# Patient Record
Sex: FEMALE | Race: WHITE | Employment: OTHER | URBAN - METROPOLITAN AREA
[De-identification: names, ages, dates, MRNs, and addresses within clinical notes are randomized per-mention and may not be internally consistent; named-entity substitution may affect disease eponyms.]

---

## 2020-12-24 ENCOUNTER — HOSPITAL ENCOUNTER (INPATIENT)
Age: 85
LOS: 6 days | Discharge: NURSING, REHAB OR LONG-TERM CARE | DRG: 481 | End: 2020-12-30
Attending: EMERGENCY MEDICINE | Admitting: INTERNAL MEDICINE
Payer: COMMERCIAL

## 2020-12-24 ENCOUNTER — APPOINTMENT (OUTPATIENT)
Dept: GENERAL RADIOLOGY | Age: 85
DRG: 481 | End: 2020-12-24
Payer: COMMERCIAL

## 2020-12-24 DIAGNOSIS — S72.002A CLOSED FRACTURE OF LEFT HIP, INITIAL ENCOUNTER (HCC): Primary | ICD-10-CM

## 2020-12-24 PROBLEM — I10 ESSENTIAL HYPERTENSION: Status: ACTIVE | Noted: 2020-12-24

## 2020-12-24 PROBLEM — E78.2 MIXED HYPERLIPIDEMIA: Status: ACTIVE | Noted: 2020-12-24

## 2020-12-24 PROBLEM — F42.9 OBSESSIVE-COMPULSIVE DISORDER: Status: ACTIVE | Noted: 2020-12-24

## 2020-12-24 PROBLEM — G31.84 MILD COGNITIVE IMPAIRMENT: Status: ACTIVE | Noted: 2020-12-24

## 2020-12-24 PROBLEM — E11.40 DIABETIC NEUROPATHY (HCC): Status: ACTIVE | Noted: 2020-12-24

## 2020-12-24 LAB
ABSOLUTE EOS #: <0.03 K/UL (ref 0–0.44)
ABSOLUTE IMMATURE GRANULOCYTE: 0.02 K/UL (ref 0–0.3)
ABSOLUTE LYMPH #: 0.87 K/UL (ref 1.1–3.7)
ABSOLUTE MONO #: 0.41 K/UL (ref 0.1–1.2)
ANION GAP SERPL CALCULATED.3IONS-SCNC: 11 MMOL/L (ref 9–17)
BASOPHILS # BLD: 1 % (ref 0–2)
BASOPHILS ABSOLUTE: 0.03 K/UL (ref 0–0.2)
BUN BLDV-MCNC: 27 MG/DL (ref 8–23)
BUN/CREAT BLD: 26 (ref 9–20)
CALCIUM SERPL-MCNC: 9.2 MG/DL (ref 8.6–10.4)
CHLORIDE BLD-SCNC: 102 MMOL/L (ref 98–107)
CO2: 23 MMOL/L (ref 20–31)
CREAT SERPL-MCNC: 1.05 MG/DL (ref 0.5–0.9)
DIFFERENTIAL TYPE: ABNORMAL
EOSINOPHILS RELATIVE PERCENT: 0 % (ref 1–4)
GFR AFRICAN AMERICAN: >60 ML/MIN
GFR NON-AFRICAN AMERICAN: 50 ML/MIN
GFR SERPL CREATININE-BSD FRML MDRD: ABNORMAL ML/MIN/{1.73_M2}
GFR SERPL CREATININE-BSD FRML MDRD: ABNORMAL ML/MIN/{1.73_M2}
GLUCOSE BLD-MCNC: 192 MG/DL (ref 70–99)
HCT VFR BLD CALC: 31.6 % (ref 36.3–47.1)
HEMOGLOBIN: 10.1 G/DL (ref 11.9–15.1)
IMMATURE GRANULOCYTES: 0 %
INR BLD: 0.9
LYMPHOCYTES # BLD: 15 % (ref 24–43)
MCH RBC QN AUTO: 31.8 PG (ref 25.2–33.5)
MCHC RBC AUTO-ENTMCNC: 32 G/DL (ref 28.4–34.8)
MCV RBC AUTO: 99.4 FL (ref 82.6–102.9)
MONOCYTES # BLD: 7 % (ref 3–12)
NRBC AUTOMATED: 0 PER 100 WBC
PARTIAL THROMBOPLASTIN TIME: 29.3 SEC (ref 23.9–33.8)
PDW BLD-RTO: 13.5 % (ref 11.8–14.4)
PLATELET # BLD: 186 K/UL (ref 138–453)
PLATELET ESTIMATE: ABNORMAL
PMV BLD AUTO: 9.2 FL (ref 8.1–13.5)
POTASSIUM SERPL-SCNC: 4.7 MMOL/L (ref 3.7–5.3)
PROTHROMBIN TIME: 12.4 SEC (ref 11.5–14.2)
RBC # BLD: 3.18 M/UL (ref 3.95–5.11)
RBC # BLD: ABNORMAL 10*6/UL
SARS-COV-2, RAPID: NOT DETECTED
SARS-COV-2: NORMAL
SARS-COV-2: NORMAL
SEG NEUTROPHILS: 77 % (ref 36–65)
SEGMENTED NEUTROPHILS ABSOLUTE COUNT: 4.62 K/UL (ref 1.5–8.1)
SODIUM BLD-SCNC: 136 MMOL/L (ref 135–144)
SOURCE: NORMAL
WBC # BLD: 6 K/UL (ref 3.5–11.3)
WBC # BLD: ABNORMAL 10*3/UL

## 2020-12-24 PROCEDURE — 85025 COMPLETE CBC W/AUTO DIFF WBC: CPT

## 2020-12-24 PROCEDURE — 80048 BASIC METABOLIC PNL TOTAL CA: CPT

## 2020-12-24 PROCEDURE — 85610 PROTHROMBIN TIME: CPT

## 2020-12-24 PROCEDURE — 99283 EMERGENCY DEPT VISIT LOW MDM: CPT

## 2020-12-24 PROCEDURE — 73552 X-RAY EXAM OF FEMUR 2/>: CPT

## 2020-12-24 PROCEDURE — 99222 1ST HOSP IP/OBS MODERATE 55: CPT | Performed by: NURSE PRACTITIONER

## 2020-12-24 PROCEDURE — 6370000000 HC RX 637 (ALT 250 FOR IP): Performed by: EMERGENCY MEDICINE

## 2020-12-24 PROCEDURE — U0003 INFECTIOUS AGENT DETECTION BY NUCLEIC ACID (DNA OR RNA); SEVERE ACUTE RESPIRATORY SYNDROME CORONAVIRUS 2 (SARS-COV-2) (CORONAVIRUS DISEASE [COVID-19]), AMPLIFIED PROBE TECHNIQUE, MAKING USE OF HIGH THROUGHPUT TECHNOLOGIES AS DESCRIBED BY CMS-2020-01-R: HCPCS

## 2020-12-24 PROCEDURE — 85730 THROMBOPLASTIN TIME PARTIAL: CPT

## 2020-12-24 PROCEDURE — 1200000000 HC SEMI PRIVATE

## 2020-12-24 PROCEDURE — U0002 COVID-19 LAB TEST NON-CDC: HCPCS

## 2020-12-24 PROCEDURE — 73502 X-RAY EXAM HIP UNI 2-3 VIEWS: CPT

## 2020-12-24 RX ORDER — ACETAMINOPHEN 325 MG/1
650 TABLET ORAL ONCE
Status: COMPLETED | OUTPATIENT
Start: 2020-12-24 | End: 2020-12-24

## 2020-12-24 RX ADMIN — ACETAMINOPHEN 650 MG: 325 TABLET ORAL at 21:59

## 2020-12-24 ASSESSMENT — ENCOUNTER SYMPTOMS
EYES NEGATIVE: 1
ALLERGIC/IMMUNOLOGIC NEGATIVE: 1
SHORTNESS OF BREATH: 0
RESPIRATORY NEGATIVE: 1
GASTROINTESTINAL NEGATIVE: 1
TROUBLE SWALLOWING: 0
ABDOMINAL PAIN: 0

## 2020-12-24 ASSESSMENT — PAIN DESCRIPTION - LOCATION
LOCATION: LEG
LOCATION: LEG

## 2020-12-24 ASSESSMENT — PAIN DESCRIPTION - FREQUENCY: FREQUENCY: CONTINUOUS

## 2020-12-24 ASSESSMENT — PAIN DESCRIPTION - PROGRESSION: CLINICAL_PROGRESSION: GRADUALLY WORSENING

## 2020-12-24 ASSESSMENT — PAIN DESCRIPTION - DESCRIPTORS
DESCRIPTORS: SHARP;CONSTANT
DESCRIPTORS: CONSTANT;THROBBING

## 2020-12-24 ASSESSMENT — PAIN DESCRIPTION - PAIN TYPE: TYPE: ACUTE PAIN

## 2020-12-25 ENCOUNTER — APPOINTMENT (OUTPATIENT)
Dept: GENERAL RADIOLOGY | Age: 85
DRG: 481 | End: 2020-12-25
Payer: COMMERCIAL

## 2020-12-25 ENCOUNTER — ANESTHESIA EVENT (OUTPATIENT)
Dept: OPERATING ROOM | Age: 85
DRG: 481 | End: 2020-12-25
Payer: COMMERCIAL

## 2020-12-25 ENCOUNTER — ANESTHESIA (OUTPATIENT)
Dept: OPERATING ROOM | Age: 85
DRG: 481 | End: 2020-12-25
Payer: COMMERCIAL

## 2020-12-25 VITALS — DIASTOLIC BLOOD PRESSURE: 48 MMHG | SYSTOLIC BLOOD PRESSURE: 91 MMHG | OXYGEN SATURATION: 95 %

## 2020-12-25 PROBLEM — I35.0 MODERATE AORTIC STENOSIS: Status: ACTIVE | Noted: 2020-12-25

## 2020-12-25 LAB
ANION GAP SERPL CALCULATED.3IONS-SCNC: 7 MMOL/L (ref 9–17)
BUN BLDV-MCNC: 24 MG/DL (ref 8–23)
BUN/CREAT BLD: 27 (ref 9–20)
CALCIUM SERPL-MCNC: 8.7 MG/DL (ref 8.6–10.4)
CHLORIDE BLD-SCNC: 106 MMOL/L (ref 98–107)
CO2: 25 MMOL/L (ref 20–31)
CREAT SERPL-MCNC: 0.88 MG/DL (ref 0.5–0.9)
GFR AFRICAN AMERICAN: >60 ML/MIN
GFR NON-AFRICAN AMERICAN: >60 ML/MIN
GFR SERPL CREATININE-BSD FRML MDRD: ABNORMAL ML/MIN/{1.73_M2}
GFR SERPL CREATININE-BSD FRML MDRD: ABNORMAL ML/MIN/{1.73_M2}
GLUCOSE BLD-MCNC: 139 MG/DL (ref 65–105)
GLUCOSE BLD-MCNC: 146 MG/DL (ref 65–105)
GLUCOSE BLD-MCNC: 153 MG/DL (ref 65–105)
GLUCOSE BLD-MCNC: 153 MG/DL (ref 65–105)
GLUCOSE BLD-MCNC: 176 MG/DL (ref 70–99)
GLUCOSE BLD-MCNC: 185 MG/DL (ref 65–105)
HCT VFR BLD CALC: 28.2 % (ref 36.3–47.1)
HEMOGLOBIN: 9.2 G/DL (ref 11.9–15.1)
INR BLD: 1
MCH RBC QN AUTO: 31.8 PG (ref 25.2–33.5)
MCHC RBC AUTO-ENTMCNC: 32.6 G/DL (ref 28.4–34.8)
MCV RBC AUTO: 97.6 FL (ref 82.6–102.9)
NRBC AUTOMATED: 0 PER 100 WBC
PDW BLD-RTO: 13.3 % (ref 11.8–14.4)
PLATELET # BLD: 163 K/UL (ref 138–453)
PMV BLD AUTO: 9 FL (ref 8.1–13.5)
POTASSIUM SERPL-SCNC: 4.8 MMOL/L (ref 3.7–5.3)
PROTHROMBIN TIME: 13.3 SEC (ref 11.5–14.2)
RBC # BLD: 2.89 M/UL (ref 3.95–5.11)
SODIUM BLD-SCNC: 138 MMOL/L (ref 135–144)
TROPONIN INTERP: NORMAL
TROPONIN INTERP: NORMAL
TROPONIN T: NORMAL NG/ML
TROPONIN T: NORMAL NG/ML
TROPONIN, HIGH SENSITIVITY: 12 NG/L (ref 0–14)
TROPONIN, HIGH SENSITIVITY: 12 NG/L (ref 0–14)
WBC # BLD: 3.8 K/UL (ref 3.5–11.3)

## 2020-12-25 PROCEDURE — 72170 X-RAY EXAM OF PELVIS: CPT

## 2020-12-25 PROCEDURE — C1713 ANCHOR/SCREW BN/BN,TIS/BN: HCPCS | Performed by: ORTHOPAEDIC SURGERY

## 2020-12-25 PROCEDURE — 2580000003 HC RX 258: Performed by: STUDENT IN AN ORGANIZED HEALTH CARE EDUCATION/TRAINING PROGRAM

## 2020-12-25 PROCEDURE — 85610 PROTHROMBIN TIME: CPT

## 2020-12-25 PROCEDURE — 36415 COLL VENOUS BLD VENIPUNCTURE: CPT

## 2020-12-25 PROCEDURE — 82947 ASSAY GLUCOSE BLOOD QUANT: CPT

## 2020-12-25 PROCEDURE — 6360000002 HC RX W HCPCS: Performed by: ANESTHESIOLOGY

## 2020-12-25 PROCEDURE — 2720000010 HC SURG SUPPLY STERILE: Performed by: ORTHOPAEDIC SURGERY

## 2020-12-25 PROCEDURE — 2780000010 HC IMPLANT OTHER: Performed by: ORTHOPAEDIC SURGERY

## 2020-12-25 PROCEDURE — C1769 GUIDE WIRE: HCPCS | Performed by: ORTHOPAEDIC SURGERY

## 2020-12-25 PROCEDURE — 7100000001 HC PACU RECOVERY - ADDTL 15 MIN: Performed by: ORTHOPAEDIC SURGERY

## 2020-12-25 PROCEDURE — 85027 COMPLETE CBC AUTOMATED: CPT

## 2020-12-25 PROCEDURE — 99232 SBSQ HOSP IP/OBS MODERATE 35: CPT | Performed by: INTERNAL MEDICINE

## 2020-12-25 PROCEDURE — 0QS706Z REPOSITION LEFT UPPER FEMUR WITH INTRAMEDULLARY INTERNAL FIXATION DEVICE, OPEN APPROACH: ICD-10-PCS | Performed by: STUDENT IN AN ORGANIZED HEALTH CARE EDUCATION/TRAINING PROGRAM

## 2020-12-25 PROCEDURE — 2580000003 HC RX 258: Performed by: ANESTHESIOLOGY

## 2020-12-25 PROCEDURE — 3600000015 HC SURGERY LEVEL 5 ADDTL 15MIN: Performed by: ORTHOPAEDIC SURGERY

## 2020-12-25 PROCEDURE — 93005 ELECTROCARDIOGRAM TRACING: CPT | Performed by: NURSE PRACTITIONER

## 2020-12-25 PROCEDURE — 3700000001 HC ADD 15 MINUTES (ANESTHESIA): Performed by: ORTHOPAEDIC SURGERY

## 2020-12-25 PROCEDURE — 6360000002 HC RX W HCPCS: Performed by: NURSE PRACTITIONER

## 2020-12-25 PROCEDURE — 86901 BLOOD TYPING SEROLOGIC RH(D): CPT

## 2020-12-25 PROCEDURE — 1200000000 HC SEMI PRIVATE

## 2020-12-25 PROCEDURE — 86900 BLOOD TYPING SEROLOGIC ABO: CPT

## 2020-12-25 PROCEDURE — 3209999900 FLUORO FOR SURGICAL PROCEDURES

## 2020-12-25 PROCEDURE — 73552 X-RAY EXAM OF FEMUR 2/>: CPT

## 2020-12-25 PROCEDURE — 80048 BASIC METABOLIC PNL TOTAL CA: CPT

## 2020-12-25 PROCEDURE — 7100000000 HC PACU RECOVERY - FIRST 15 MIN: Performed by: ORTHOPAEDIC SURGERY

## 2020-12-25 PROCEDURE — 84484 ASSAY OF TROPONIN QUANT: CPT

## 2020-12-25 PROCEDURE — 3700000000 HC ANESTHESIA ATTENDED CARE: Performed by: ORTHOPAEDIC SURGERY

## 2020-12-25 PROCEDURE — 86920 COMPATIBILITY TEST SPIN: CPT

## 2020-12-25 PROCEDURE — 2709999900 HC NON-CHARGEABLE SUPPLY: Performed by: ORTHOPAEDIC SURGERY

## 2020-12-25 PROCEDURE — 83036 HEMOGLOBIN GLYCOSYLATED A1C: CPT

## 2020-12-25 PROCEDURE — 6360000002 HC RX W HCPCS: Performed by: STUDENT IN AN ORGANIZED HEALTH CARE EDUCATION/TRAINING PROGRAM

## 2020-12-25 PROCEDURE — 2500000003 HC RX 250 WO HCPCS: Performed by: ANESTHESIOLOGY

## 2020-12-25 PROCEDURE — 3600000005 HC SURGERY LEVEL 5 BASE: Performed by: ORTHOPAEDIC SURGERY

## 2020-12-25 PROCEDURE — 86850 RBC ANTIBODY SCREEN: CPT

## 2020-12-25 PROCEDURE — 6370000000 HC RX 637 (ALT 250 FOR IP): Performed by: STUDENT IN AN ORGANIZED HEALTH CARE EDUCATION/TRAINING PROGRAM

## 2020-12-25 DEVICE — IMPLANTABLE DEVICE: Type: IMPLANTABLE DEVICE | Site: HIP | Status: FUNCTIONAL

## 2020-12-25 DEVICE — NAIL IM L380MM DIA10MM 130DEG LNG L PROX FEM GRN TI CANN: Type: IMPLANTABLE DEVICE | Site: HIP | Status: FUNCTIONAL

## 2020-12-25 DEVICE — SCREW BNE L52MM DIA5MM ST TIB LT GRN TI ST CANN LOK FULL: Type: IMPLANTABLE DEVICE | Site: HIP | Status: FUNCTIONAL

## 2020-12-25 RX ORDER — PROMETHAZINE HYDROCHLORIDE 12.5 MG/1
12.5 TABLET ORAL EVERY 6 HOURS PRN
Status: DISCONTINUED | OUTPATIENT
Start: 2020-12-25 | End: 2020-12-30 | Stop reason: HOSPADM

## 2020-12-25 RX ORDER — SODIUM CHLORIDE 0.9 % (FLUSH) 0.9 %
10 SYRINGE (ML) INJECTION EVERY 12 HOURS SCHEDULED
Status: DISCONTINUED | OUTPATIENT
Start: 2020-12-25 | End: 2020-12-30 | Stop reason: HOSPADM

## 2020-12-25 RX ORDER — SODIUM CHLORIDE, SODIUM LACTATE, POTASSIUM CHLORIDE, CALCIUM CHLORIDE 600; 310; 30; 20 MG/100ML; MG/100ML; MG/100ML; MG/100ML
INJECTION, SOLUTION INTRAVENOUS CONTINUOUS PRN
Status: DISCONTINUED | OUTPATIENT
Start: 2020-12-25 | End: 2020-12-25 | Stop reason: SDUPTHER

## 2020-12-25 RX ORDER — PHENYLEPHRINE HCL IN 0.9% NACL 1 MG/10 ML
SYRINGE (ML) INTRAVENOUS PRN
Status: DISCONTINUED | OUTPATIENT
Start: 2020-12-25 | End: 2020-12-25 | Stop reason: SDUPTHER

## 2020-12-25 RX ORDER — MORPHINE SULFATE 10 MG/ML
INJECTION, SOLUTION INTRAMUSCULAR; INTRAVENOUS PRN
Status: DISCONTINUED | OUTPATIENT
Start: 2020-12-25 | End: 2020-12-25 | Stop reason: SDUPTHER

## 2020-12-25 RX ORDER — NICOTINE 21 MG/24HR
1 PATCH, TRANSDERMAL 24 HOURS TRANSDERMAL DAILY PRN
Status: DISCONTINUED | OUTPATIENT
Start: 2020-12-25 | End: 2020-12-30 | Stop reason: HOSPADM

## 2020-12-25 RX ORDER — POTASSIUM CHLORIDE 7.45 MG/ML
10 INJECTION INTRAVENOUS PRN
Status: DISCONTINUED | OUTPATIENT
Start: 2020-12-25 | End: 2020-12-30 | Stop reason: HOSPADM

## 2020-12-25 RX ORDER — MORPHINE SULFATE 2 MG/ML
2 INJECTION, SOLUTION INTRAMUSCULAR; INTRAVENOUS
Status: DISCONTINUED | OUTPATIENT
Start: 2020-12-25 | End: 2020-12-28

## 2020-12-25 RX ORDER — MAGNESIUM SULFATE 1 G/100ML
1 INJECTION INTRAVENOUS PRN
Status: DISCONTINUED | OUTPATIENT
Start: 2020-12-25 | End: 2020-12-30 | Stop reason: HOSPADM

## 2020-12-25 RX ORDER — SODIUM CHLORIDE 0.9 % (FLUSH) 0.9 %
10 SYRINGE (ML) INJECTION PRN
Status: DISCONTINUED | OUTPATIENT
Start: 2020-12-25 | End: 2020-12-30 | Stop reason: HOSPADM

## 2020-12-25 RX ORDER — ACETAMINOPHEN 650 MG/1
650 SUPPOSITORY RECTAL EVERY 6 HOURS PRN
Status: DISCONTINUED | OUTPATIENT
Start: 2020-12-25 | End: 2020-12-30 | Stop reason: HOSPADM

## 2020-12-25 RX ORDER — POLYETHYLENE GLYCOL 3350 17 G/17G
17 POWDER, FOR SOLUTION ORAL DAILY PRN
Status: DISCONTINUED | OUTPATIENT
Start: 2020-12-25 | End: 2020-12-30 | Stop reason: HOSPADM

## 2020-12-25 RX ORDER — ONDANSETRON 2 MG/ML
4 INJECTION INTRAMUSCULAR; INTRAVENOUS
Status: COMPLETED | OUTPATIENT
Start: 2020-12-25 | End: 2020-12-25

## 2020-12-25 RX ORDER — LIDOCAINE HYDROCHLORIDE 20 MG/ML
INJECTION, SOLUTION EPIDURAL; INFILTRATION; INTRACAUDAL; PERINEURAL PRN
Status: DISCONTINUED | OUTPATIENT
Start: 2020-12-25 | End: 2020-12-25 | Stop reason: SDUPTHER

## 2020-12-25 RX ORDER — POTASSIUM CHLORIDE 20 MEQ/1
40 TABLET, EXTENDED RELEASE ORAL PRN
Status: DISCONTINUED | OUTPATIENT
Start: 2020-12-25 | End: 2020-12-30 | Stop reason: HOSPADM

## 2020-12-25 RX ORDER — HYDROMORPHONE HCL 110MG/55ML
0.25 PATIENT CONTROLLED ANALGESIA SYRINGE INTRAVENOUS EVERY 5 MIN PRN
Status: DISCONTINUED | OUTPATIENT
Start: 2020-12-25 | End: 2020-12-25 | Stop reason: HOSPADM

## 2020-12-25 RX ORDER — MORPHINE SULFATE 4 MG/ML
4 INJECTION, SOLUTION INTRAMUSCULAR; INTRAVENOUS
Status: DISCONTINUED | OUTPATIENT
Start: 2020-12-25 | End: 2020-12-28

## 2020-12-25 RX ORDER — DIPHENHYDRAMINE HYDROCHLORIDE 50 MG/ML
25 INJECTION INTRAMUSCULAR; INTRAVENOUS ONCE
Status: DISCONTINUED | OUTPATIENT
Start: 2020-12-25 | End: 2020-12-30 | Stop reason: HOSPADM

## 2020-12-25 RX ORDER — ONDANSETRON 2 MG/ML
4 INJECTION INTRAMUSCULAR; INTRAVENOUS EVERY 6 HOURS PRN
Status: DISCONTINUED | OUTPATIENT
Start: 2020-12-25 | End: 2020-12-30 | Stop reason: HOSPADM

## 2020-12-25 RX ORDER — SODIUM CHLORIDE 9 MG/ML
INJECTION, SOLUTION INTRAVENOUS CONTINUOUS PRN
Status: DISCONTINUED | OUTPATIENT
Start: 2020-12-25 | End: 2020-12-25 | Stop reason: SDUPTHER

## 2020-12-25 RX ORDER — FENTANYL CITRATE 50 UG/ML
25 INJECTION, SOLUTION INTRAMUSCULAR; INTRAVENOUS EVERY 5 MIN PRN
Status: DISCONTINUED | OUTPATIENT
Start: 2020-12-25 | End: 2020-12-25 | Stop reason: HOSPADM

## 2020-12-25 RX ORDER — PROPOFOL 10 MG/ML
INJECTION, EMULSION INTRAVENOUS CONTINUOUS PRN
Status: DISCONTINUED | OUTPATIENT
Start: 2020-12-25 | End: 2020-12-25 | Stop reason: SDUPTHER

## 2020-12-25 RX ORDER — SODIUM CHLORIDE 9 MG/ML
INJECTION, SOLUTION INTRAVENOUS CONTINUOUS
Status: DISCONTINUED | OUTPATIENT
Start: 2020-12-25 | End: 2020-12-26

## 2020-12-25 RX ORDER — LISINOPRIL 10 MG/1
10 TABLET ORAL DAILY
COMMUNITY

## 2020-12-25 RX ORDER — ACETAMINOPHEN 325 MG/1
650 TABLET ORAL EVERY 6 HOURS PRN
Status: DISCONTINUED | OUTPATIENT
Start: 2020-12-25 | End: 2020-12-30 | Stop reason: HOSPADM

## 2020-12-25 RX ORDER — CEFAZOLIN SODIUM 1 G/3ML
INJECTION, POWDER, FOR SOLUTION INTRAMUSCULAR; INTRAVENOUS PRN
Status: DISCONTINUED | OUTPATIENT
Start: 2020-12-25 | End: 2020-12-25 | Stop reason: SDUPTHER

## 2020-12-25 RX ADMIN — MORPHINE SULFATE 4 MG: 4 INJECTION, SOLUTION INTRAMUSCULAR; INTRAVENOUS at 04:40

## 2020-12-25 RX ADMIN — Medication 100 MCG: at 14:20

## 2020-12-25 RX ADMIN — PROMETHAZINE HYDROCHLORIDE 12.5 MG: 12.5 TABLET ORAL at 16:54

## 2020-12-25 RX ADMIN — Medication 50 MCG: at 14:32

## 2020-12-25 RX ADMIN — SODIUM CHLORIDE: 9 INJECTION, SOLUTION INTRAVENOUS at 17:40

## 2020-12-25 RX ADMIN — Medication 100 MCG: at 13:24

## 2020-12-25 RX ADMIN — Medication 50 MCG: at 14:14

## 2020-12-25 RX ADMIN — Medication 100 MCG: at 13:59

## 2020-12-25 RX ADMIN — SODIUM CHLORIDE, POTASSIUM CHLORIDE, SODIUM LACTATE AND CALCIUM CHLORIDE: 600; 310; 30; 20 INJECTION, SOLUTION INTRAVENOUS at 14:43

## 2020-12-25 RX ADMIN — MORPHINE SULFATE 4 MG: 4 INJECTION, SOLUTION INTRAMUSCULAR; INTRAVENOUS at 08:14

## 2020-12-25 RX ADMIN — SODIUM CHLORIDE: 9 INJECTION, SOLUTION INTRAVENOUS at 13:23

## 2020-12-25 RX ADMIN — CEFAZOLIN 2 G: 10 INJECTION, POWDER, FOR SOLUTION INTRAVENOUS at 17:45

## 2020-12-25 RX ADMIN — ONDANSETRON 4 MG: 2 INJECTION INTRAMUSCULAR; INTRAVENOUS at 15:36

## 2020-12-25 RX ADMIN — Medication 100 MCG: at 13:29

## 2020-12-25 RX ADMIN — Medication 100 MCG: at 13:55

## 2020-12-25 RX ADMIN — MORPHINE SULFATE 2 MG: 10 INJECTION INTRAVENOUS at 14:47

## 2020-12-25 RX ADMIN — Medication 100 MCG: at 13:26

## 2020-12-25 RX ADMIN — Medication 100 MCG: at 13:45

## 2020-12-25 RX ADMIN — Medication 100 MCG: at 13:11

## 2020-12-25 RX ADMIN — Medication 75 MCG: at 13:42

## 2020-12-25 RX ADMIN — LIDOCAINE HYDROCHLORIDE 5 ML: 20 INJECTION, SOLUTION EPIDURAL; INFILTRATION; INTRACAUDAL; PERINEURAL at 13:00

## 2020-12-25 RX ADMIN — MORPHINE SULFATE 4 MG: 4 INJECTION, SOLUTION INTRAMUSCULAR; INTRAVENOUS at 00:33

## 2020-12-25 RX ADMIN — PROPOFOL 30 MCG/KG/MIN: 10 INJECTION, EMULSION INTRAVENOUS at 13:01

## 2020-12-25 RX ADMIN — Medication 50 MCG: at 14:28

## 2020-12-25 RX ADMIN — CEFAZOLIN 2000 MG: 1 INJECTION, POWDER, FOR SOLUTION INTRAMUSCULAR; INTRAVENOUS at 13:17

## 2020-12-25 ASSESSMENT — PULMONARY FUNCTION TESTS
PIF_VALUE: 1
PIF_VALUE: 0
PIF_VALUE: 1
PIF_VALUE: 0
PIF_VALUE: 1
PIF_VALUE: 0
PIF_VALUE: 1
PIF_VALUE: 0
PIF_VALUE: 1
PIF_VALUE: 0
PIF_VALUE: 1
PIF_VALUE: 0
PIF_VALUE: 1
PIF_VALUE: 0
PIF_VALUE: 0
PIF_VALUE: 1
PIF_VALUE: 0
PIF_VALUE: 1
PIF_VALUE: 0
PIF_VALUE: 1
PIF_VALUE: 1
PIF_VALUE: 0
PIF_VALUE: 1
PIF_VALUE: 0
PIF_VALUE: 1
PIF_VALUE: 0
PIF_VALUE: 1
PIF_VALUE: 0
PIF_VALUE: 1
PIF_VALUE: 0
PIF_VALUE: 1
PIF_VALUE: 0
PIF_VALUE: 1
PIF_VALUE: 0
PIF_VALUE: 1
PIF_VALUE: 0
PIF_VALUE: 0
PIF_VALUE: 1
PIF_VALUE: 0
PIF_VALUE: 1
PIF_VALUE: 0
PIF_VALUE: 1
PIF_VALUE: 0
PIF_VALUE: 1
PIF_VALUE: 0
PIF_VALUE: 0
PIF_VALUE: 1
PIF_VALUE: 0
PIF_VALUE: 1

## 2020-12-25 ASSESSMENT — PAIN SCALES - GENERAL
PAINLEVEL_OUTOF10: 7
PAINLEVEL_OUTOF10: 9
PAINLEVEL_OUTOF10: 10
PAINLEVEL_OUTOF10: 0

## 2020-12-25 ASSESSMENT — PAIN DESCRIPTION - LOCATION: LOCATION: HIP

## 2020-12-25 ASSESSMENT — PAIN DESCRIPTION - ORIENTATION: ORIENTATION: LEFT

## 2020-12-25 ASSESSMENT — ENCOUNTER SYMPTOMS: BACK PAIN: 0

## 2020-12-25 ASSESSMENT — LIFESTYLE VARIABLES: SMOKING_STATUS: 0

## 2020-12-25 NOTE — ED TRIAGE NOTES
Here tonight with daughter. Daughter states pt fell at granddaughter's house when stepping down one step tonight at [address]. She was walking alone with cane, fell down on left side. \"I contemplated calling EMS, but was able to get her up and here in her car. \" States her lateral left thigh is sore. Left leg noted to be externally rotated and is shorter than left leg. No ecchymosis nor edema noted. Palpable pedal pulses. Feet cool, dry. Dr Cisse Memos in to see pt.

## 2020-12-25 NOTE — H&P
History and Physical        CHIEF COMPLAINT: Left hip pain    HISTORY OF PRESENT ILLNESS:      The patient is a 81 y.o. female  who presents with left hip pain after a fall. The patient was attending a [employer] party and had a slip and fall in the house. She experienced immediate pain and inability to ambulate after the fall. She was brought to [facility] where she was diagnosed as having a left hip fracture. Orthopedic consultation was requested.     Past Medical History:    Past Medical History:   Diagnosis Date    Diabetes mellitus ([address]     Hyperlipidemia     Hypertension        Past Surgical History:    Past Surgical History:   Procedure Laterality Date    APPENDECTOMY      CHOLECYSTECTOMY         Medications Prior to Admission:   Current Facility-Administered Medications   Medication Dose Route Frequency Provider Last Rate Last Admin    sodium chloride flush 0.9 % injection 10 mL  10 mL Intravenous 2 times per day SHIRA Nguyen - CNP        sodium chloride flush 0.9 % injection 10 mL  10 mL Intravenous PRN SHIRA Nguyen CNP        potassium chloride (KLOR-CON M) extended release tablet 40 mEq  40 mEq Oral PRN SHIRA Nguyen - CNP        Or    potassium bicarb-citric acid (EFFER-K) effervescent tablet 40 mEq  40 mEq Oral PRN SHIRA Nguyen CNP        Or    potassium chloride 10 mEq/100 mL IVPB (Peripheral Line)  10 mEq Intravenous PRN SHIRA Nguyen CNP        magnesium sulfate 1 g in dextrose 5% 100 mL IVPB  1 g Intravenous PRN SHIRA Nguyen - CNP        enoxaparin (LOVENOX) injection 40 mg  40 mg Subcutaneous Daily SHIRA Nguyen CNP        promethazine (PHENERGAN) tablet 12.5 mg  12.5 mg Oral Q6H PRN SHIRA Nguyen CNP        Or    ondansetron (ZOFRAN) injection 4 mg  4 mg Intravenous Q6H PRN SHIRA Nguyen CNP        polyethylene glycol (GLYCOLAX) packet 17 g  17 g Oral Daily PRN Dinorah Barton APRN - CNP        nicotine (NICODERM CQ) 21 MG/24HR 1 patch  1 patch Transdermal Daily PRN Dinorah Barton APRN - CNP        acetaminophen (TYLENOL) tablet 650 mg  650 mg Oral Q6H PRN Dinorah AINSLEY Barton, APRN - CNP        Or    acetaminophen (TYLENOL) suppository 650 mg  650 mg Rectal Q6H PRN Dinorah Barton APRN - CNP        diphenhydrAMINE (BENADRYL) injection 25 mg  25 mg Intravenous Once Roro HuynhSHIRA - CNP        morphine (PF) injection 2 mg  2 mg Intravenous Q3H PRN Htao Bodily APRN - CNP        Or    morphine sulfate (PF) injection 4 mg  4 mg Intravenous Q3H PRN Thao Bodily, APRN - CNP   4 mg at 12/25/20 0814    0.9 % sodium chloride infusion   Intravenous Continuous SHIRA Nguyen - CNP 75 mL/hr at 12/25/20 0455 Restarted at 12/25/20 0455         Allergies:  Aleve [naproxen]    Social History:   Social History     Tobacco Use   Smoking Status Never Smoker   Smokeless Tobacco Never Used     Social History     Substance and Sexual Activity   Alcohol Use Never    Frequency: Never     Social History     Substance and Sexual Activity   Drug Use Never       Family History:  History reviewed. No pertinent family history.       REVIEW OF SYSTEMS:  Gen: Negative for nausea, vomiting, diarrhea, fever, chills, night sweats  Heart: Negative for HTN, palpitations, chest pain  Lungs: Negative for wheezes, asthma or SOB  GI: Negative for nausea, vomiting  Endo: Negative for diabetes  Heme: Negative for DVT       PHYSICAL EXAM:  Patient Vitals for the past 24 hrs:   BP Temp Temp src Pulse Resp SpO2 Height Weight   12/25/20 0622 (!) 143/50 97.5 °F (36.4 °C) Oral 65 16 98 % -- --   12/25/20 0605 -- -- -- -- -- -- -- 198 lb 4.8 oz (89.9 kg)   12/25/20 0013 (!) 132/53 97.5 °F (36.4 °C) Oral 68 18 99 % -- --   12/24/20 2315 (!) 140/58 -- -- 65 16 100 % -- --   12/24/20 2051 (!) 126/52 97.5 °F (36.4 °C) Oral 59 18 100 % 5' 5\" (1.651 m) 198 lb (89.8 kg) Gen: alert and oriented  Head: normorcephalic, atraumatic  Neck: supple  Heart: RRR  Lungs: No audible wheezes  Abdomen: soft  Pelvis: stable  Extremity pleasant female in no acute distress and awake and oriented x3. She is neurovascularly intact in the entire left lower extremity. Her skin is clean dry and intact. Cap refill is less than 2 seconds. Patient's extremity is painful to any motion. DATA:  CBC:   Lab Results   Component Value Date    WBC 3.8 12/25/2020    HGB 9.2 12/25/2020     12/25/2020     BMP:    Lab Results   Component Value Date     12/25/2020    K 4.8 12/25/2020     12/25/2020    CO2 25 12/25/2020    BUN 24 12/25/2020    CREATININE 0.88 12/25/2020    CALCIUM 8.7 12/25/2020    GLUCOSE 176 12/25/2020     PT/INR:    Lab Results   Component Value Date    PROTIME 13.3 12/25/2020    INR 1.0 12/25/2020     Troponin:  No results found for: [address] [place] [address]    Radiology:   EXAMINATION:   ONE XRAY VIEW OF THE PELVIS AND TWO XRAY VIEWS LEFT HIP       12/24/2020 9:40 pm       COMPARISON:   None.       HISTORY:   ORDERING SYSTEM PROVIDED HISTORY: pain after fall   TECHNOLOGIST PROVIDED HISTORY:   pain after fall   Reason for Exam: Pain in left hip, fall today   Acuity: Acute   Type of Exam: Initial       FINDINGS:   Diffuse bone demineralization.  Comminuted, displaced intertrochanteric left   femoral fracture.  Left hip alignment is maintained.  Degenerative changes   are noted in the lower lumbar spine and both hips.           Impression   Comminuted, displaced intertrochanteric left femoral fracture. Patient has a comminuted reverse oblique intertrochanteric fracture. ASSESSMENT:Principal Problem:    Closed displaced fracture of left femoral neck (HCC)  Active Problems:    Diabetic neuropathy (HCC)    Essential hypertension    Mixed hyperlipidemia    Mild cognitive impairment    Obsessive-compulsive disorder  Resolved Problems:    * No resolved hospital problems.  * PLAN:  1) when the patient is clear for surgery I plan on performing a left hip ORIF using a cephalomedullary nail. I would like to perform this as soon as possible.       Antonio Zhang MD

## 2020-12-25 NOTE — CONSULTS
[facility] Cardiology   Consult Note             Date:   12/25/2020  Patient name: Asael Brasher  Date of admission:  12/24/2020  8:57 PM  MRN:   [id number]  YOB: 1935    Reason for Admission:  Preoperative cardiac evaluation    CHIEF COMPLAINT:  Hip fracture     History Obtained From:  patient, electronic medical record    HISTORY OF PRESENT ILLNESS:      The patient is a 81 y.o.  female who is admitted to the hospital for left hip fracture. Cardiology was consulted for preoperative cardiac evaluation. She was at a [employer] and slipped and fell on the stairs. She had a left hip fracture and cardiology was consulted to clear him for surgery. No chest pain orthopnea proximal to the dyspnea no history of smoking    Past Medical History:   has a past medical history of Diabetes mellitus ([address] Hyperlipidemia, and Hypertension. Past Surgical History:   has a past surgical history that includes Cholecystectomy and Appendectomy. Home Medications:    Prior to Admission medications    Medication Sig Start Date End Date Taking? Authorizing Provider   lisinopril (PRINIVIL;ZESTRIL) 10 MG tablet Take 10 mg by mouth daily   Yes Historical Provider, MD   METFORMIN HCL PO Take by mouth 2 times daily   Yes Historical Provider, MD   Atorvastatin Calcium (LIPITOR PO) Take by mouth daily   Yes Historical Provider, MD       Allergies:  Aleve [naproxen]    Social History:   reports that she has never smoked. She has never used smokeless tobacco. She reports that she does not drink alcohol or use drugs. Family History: family history is not on file. No for premature CAD. No for h/o sudden cardiac death    REVIEW OF SYSTEMS:    · Constitutional: there has been no unanticipated weight loss. There's been No change in energy level, No change in activity level. · Eyes: No visual changes or diplopia. No scleral icterus. · ENT: No Headaches, hearing loss or vertigo.  No mouth sores or sore present  Extremities:  ·  No Cyanosis or Clubbing  ·  Lower extremity edema: No  ·  Skin: Warm and dry  Neurological:  · Alert and oriented. · Moves all extremities well  · No abnormalities of mood, affect, memory, mentation, or behavior are noted    DATA:    Diagnostics:      EKG: normal EKG, normal sinus rhythm, unchanged from previous tracings. ECHO: Pending  Labs:     CBC:   Recent Labs     12/24/20 2200 12/25/20  0659   WBC 6.0 3.8   HGB 10.1* 9.2*   HCT 31.6* 28.2*    163     BMP:   Recent Labs     12/24/20 2214 12/25/20  0659    138   K 4.7 4.8   CO2 23 25   BUN 27* 24*   CREATININE 1.05* 0.88   LABGLOM 50* >60   GLUCOSE 192* 176*     BNP: No results for input(s): BNP in the last 72 hours. PT/INR:   Recent Labs     12/24/20 2214 12/25/20  0659   PROTIME 12.4 13.3   INR 0.9 1.0     APTT:  Recent Labs     12/24/20 2214   APTT 29.3     CARDIAC ENZYMES:No results for input(s): CKTOTAL, CKMB, CKMBINDEX, TROPONINI in the last 72 hours. FASTING LIPID PANEL:No results found for: HDL, LDLDIRECT, LDLCALC, TRIG  LIVER PROFILE:No results for input(s): AST, ALT, LABALBU in the last 72 hours. IMPRESSION:    Patient Active Problem List   Diagnosis    Diabetic neuropathy ([facility]    Essential hypertension    Mixed hyperlipidemia    Mild cognitive impairment    Obsessive-compulsive disorder    Closed displaced fracture of left femoral neck (HCC)       RECOMMENDATIONS:  1. Preoperative cardiac evaluation  2. Murmur which is most likely aortic stenosis  3. Left hip fracture  Patient is cleared for surgery as low risk. She has no history of coronary artery disease, no history of shortness of breath and has an incidentally noted murmur which is most likely moderate aortic stenosis. Has not had any history of chest pain presyncope or syncope. Patient can go for surgery and we will evaluate her valve as an outpatient in the future. Thank you for allowing me to see this patient.     Discussed with patient and nursing.     Isacc Tapia MD  [facility] cardiology

## 2020-12-25 NOTE — DISCHARGE INSTR - COC
Continuity of Care Form    Patient Name: Ivan Zuñiga   :  1935  MRN:  [id number]    Admit date:  2020  Discharge date:  2020    Code Status Order: Full Code   Advance Directives:   [facility] Documentation       Date/Time Healthcare Directive Type of Healthcare Directive Copy in [address] Agent's Name Healthcare Agent's Phone Number    [phone]  Yes, patient has an advance directive for healthcare treatment  Durable power of  for health care  No, copy requested from family  Adult  [address]  --            Admitting Physician:  Myra Zapata MD  PCP: Paco Desouza MD    Discharging Nurse: [facility] Unit/Room#: [facility]  Discharging Unit Phone Number: [phone]    Emergency Contact:   Extended Emergency Contact Information  Primary Emergency Contact: [address] [place] [address], [place] [address] Phone: [phone]  Relation: Child    Past Surgical History:  Past Surgical History:   Procedure Laterality Date    APPENDECTOMY      CHOLECYSTECTOMY         Immunization History: There is no immunization history on file for this patient.     Active Problems:  Patient Active Problem List   Diagnosis Code    Diabetic neuropathy ([facility] E11.40    Essential hypertension I10    Mixed hyperlipidemia E78.2    Mild cognitive impairment G31.84    Obsessive-compulsive disorder F42.9    Closed displaced fracture of left femoral neck (HCC) S72.002A    Moderate aortic stenosis I35.0       Isolation/Infection:   Isolation            No Isolation          Patient Infection Status       None to display            Nurse Assessment:  Last Vital Signs: BP (!) 129/53   Pulse 62   Temp 97.6 °F (36.4 °C) (Oral)   Resp 18   Ht 5' 5\" (1.651 m)   Wt 198 lb 4.8 oz (89.9 kg)   SpO2 97%   BMI 33.00 kg/m²     Last documented pain score (0-10 scale): Pain Level: 9  Last Weight:   Wt Readings from Last 1 Encounters:   20 198 lb 4.8 oz (89.9 kg)     Mental Status:  oriented and alert    IV Access:  - None    Nursing Mobility/ADLs:  Walking   Assisted  Transfer  Assisted  Bathing  Assisted  Dressing  Assisted  Toileting  Assisted  Feeding  [address] Delivery   whole    Wound Care Documentation and Therapy:        Elimination:  Continence:   · Bowel: Yes  · Bladder: Yes-uses brief for stress incontinence   Urinary Catheter: {Urinary Catheter:[id number]}   Colostomy/Ileostomy/Ileal Conduit: No       Date of Last BM: ***    Intake/Output Summary (Last 24 hours) at 12/25/2020 1453  Last data filed at 12/25/2020 1442  Gross per 24 hour   Intake 1000 ml   Output 150 ml   Net 850 ml     No intake/output data recorded. Safety Concerns:     History of Falls (last 30 days) and At Risk for Falls    Impairments/Disabilities:      None    Nutrition Therapy:  Current Nutrition Therapy:   - Oral Diet:  General    Routes of Feeding: Oral  Liquids: No Restrictions  Daily Fluid Restriction: no  Last Modified Barium Swallow with Video (Video Swallowing Test): not done    Treatments at the Time of Hospital Discharge:   Respiratory Treatments: none  Oxygen Therapy:  is not on home oxygen therapy.   Ventilator:    - No ventilator support    Rehab Therapies: Physical Therapy and Occupational Therapy  Weight Bearing Status/Restrictions: No weight bearing restirctions  Other Medical Equipment (for information only, NOT a DME order):  walker, bath bench and bedside commode  Other Treatments: skilled nursing care, FWB, maintain skin integrity-mepilex to coccyx w/every two hour turns,follow up with Dr. Grace Browne in one week     Patient's personal belongings (please select all that are sent with patient):  Glasses, Dentures upper and lower    RN SIGNATURE:  Electronically signed by Ike Covington RN on 12/30/20 at 11:54 AM EST    CASE MANAGEMENT/SOCIAL WORK SECTION    Inpatient Status Date: 12/24/2020    Readmission Risk Assessment Score:  Readmission Risk              Risk of Unplanned Readmission:        12           Discharging to Facility/ Agency   · Name: [facility]  · Address:  · Phone:[phone]   · SZU:[phone]    Prior To Admission Patient was utilizing  · Name: [id number]  · Phone: [phone]  · Fax:    / signature: Electronically signed by REBECCA Robertson LSW on 12/28/20 at 4:22 PM EST    PHYSICIAN SECTION    Prognosis: Fair    Condition at Discharge: Stable    Rehab Potential (if transferring to Rehab): Fair    Recommended Labs or Other Treatments After Discharge:   Orthopedic evaluation and treatment, f/u Dr Nancy Powers   Physical therapy / rehab  DVT prophylaxis: Eliquis  Pain management   Glycemic contol - monitor and control blood sugars  Blood Pressure - Monitor and control  Blood products prn - will check H&H  Anemia w/u on outpatient basis is suggested     Physician Certification: I certify the above information and transfer of Trinity Becerra  is necessary for the continuing treatment of the diagnosis listed and that she requires [facility] for less 30 days. Update Admission H&P:   Principal Problem:    Closed displaced fracture of left femoral neck (HCC)  Active Problems:    Diabetic neuropathy (HCC)    Essential hypertension    Mixed hyperlipidemia    Mild cognitive impairment    Obsessive-compulsive disorder    Moderate aortic stenosis  Resolved Problems:    * No resolved hospital problems.  *     PHYSICIAN SIGNATURE:  Electronically signed by Dinora Davis DO on 12/29/20 at 10:10 AM EST

## 2020-12-25 NOTE — ANESTHESIA PRE PROCEDURE
Department of Anesthesiology  Preprocedure Note       Name:  Ashutosh Rocha   Age:  81 y.o.  :  1935                                          MRN:  [id number]         Date:  2020      Surgeon: Thu Renteria):  Katie Guerrero MD    Procedure: Procedure(s):  HIP PINNING    Medications prior to admission:   Prior to Admission medications    Medication Sig Start Date End Date Taking?  Authorizing Provider   lisinopril (PRINIVIL;ZESTRIL) 10 MG tablet Take 10 mg by mouth daily   Yes Historical Provider, MD   METFORMIN HCL PO Take by mouth 2 times daily   Yes Historical Provider, MD   Atorvastatin Calcium (LIPITOR PO) Take by mouth daily   Yes Historical Provider, MD       Current medications:    Current Facility-Administered Medications   Medication Dose Route Frequency Provider Last Rate Last Admin    sodium chloride flush 0.9 % injection 10 mL  10 mL Intravenous 2 times per day Dinorah Barton APRN - CNP        sodium chloride flush 0.9 % injection 10 mL  10 mL Intravenous PRN Dinorah Barton APRN - CNP        potassium chloride (KLOR-CON M) extended release tablet 40 mEq  40 mEq Oral PRN Dinorah Barton APRN - CNP        Or    potassium bicarb-citric acid (EFFER-K) effervescent tablet 40 mEq  40 mEq Oral PRN Dinorah Barton APRN - CNP        Or    potassium chloride 10 mEq/100 mL IVPB (Peripheral Line)  10 mEq Intravenous PRN Dinorah Barton APRN - CNP        magnesium sulfate 1 g in dextrose 5% 100 mL IVPB  1 g Intravenous PRN Dinorah Barton APRN - CNP        enoxaparin (LOVENOX) injection 40 mg  40 mg Subcutaneous Daily Dinorah Barton APRN - CNP        promethazine (PHENERGAN) tablet 12.5 mg  12.5 mg Oral Q6H PRN Dinorah Barton APRN - CNP        Or    ondansetron (ZOFRAN) injection 4 mg  4 mg Intravenous Q6H PRN Dinorah Barton APRN - CNP        polyethylene glycol (GLYCOLAX) packet 17 g  17 g Oral Daily PRN Dinorah Barton APRN - CNP  nicotine (NICODERM CQ) 21 MG/24HR 1 patch  1 patch Transdermal Daily PRN Dinorah D Delgrosso, APRN - CNP        acetaminophen (TYLENOL) tablet 650 mg  650 mg Oral Q6H PRN Dinorah D Delgrosso, APRN - CNP        Or    acetaminophen (TYLENOL) suppository 650 mg  650 mg Rectal Q6H PRN Dinorah D Delgrosso, APRN - CNP        diphenhydrAMINE (BENADRYL) injection 25 mg  25 mg Intravenous Once Ludin Libia, APRN - CNP        morphine (PF) injection 2 mg  2 mg Intravenous Q3H PRN Ludin Libia, APRN - CNP        Or    morphine sulfate (PF) injection 4 mg  4 mg Intravenous Q3H PRN Ludin Libia, APRN - CNP   4 mg at 12/25/20 0814    0.9 % sodium chloride infusion   Intravenous Continuous Dinorah D Delgrosso, APRN - CNP 75 mL/hr at 12/25/20 0455 Restarted at 12/25/20 0455       Allergies:     Allergies   Allergen Reactions    Aleve [Naproxen] Swelling       Problem List:    Patient Active Problem List   Diagnosis Code    Diabetic neuropathy ([facility] E11.40    Essential hypertension I10    Mixed hyperlipidemia E78.2    Mild cognitive impairment G31.84    Obsessive-compulsive disorder F42.9    Closed displaced fracture of left femoral neck ([facility] S72.002A       Past Medical History:        Diagnosis Date    Diabetes mellitus ([facility]     Hyperlipidemia     Hypertension        Past Surgical History:        Procedure Laterality Date    APPENDECTOMY      CHOLECYSTECTOMY         Social History:    Social History     Tobacco Use    Smoking status: Never Smoker    Smokeless tobacco: Never Used   Substance Use Topics    Alcohol use: Never     Frequency: Never                                Counseling given: Not Answered      Vital Signs (Current):   Vitals:    12/25/20 0013 12/25/20 0605 12/25/20 0622 12/25/20 1120   BP: (!) 132/53  (!) 143/50 (!) 129/53   Pulse: 68  65 62   Resp: 18  16 18   Temp: 97.5 °F (36.4 °C)  97.5 °F (36.4 °C) 97.6 °F (36.4 °C)   TempSrc: Oral  Oral Oral   SpO2: 99%  98% 97% Weight:  198 lb 4.8 oz (89.9 kg)     Height:                                                  BP Readings from Last 3 Encounters:   12/25/20 (!) 129/53       NPO Status:                                                                                 BMI:   Wt Readings from Last 3 Encounters:   12/25/20 198 lb 4.8 oz (89.9 kg)     Body mass index is 33 kg/m².     CBC:   Lab Results   Component Value Date    WBC 3.8 12/25/2020    RBC 2.89 12/25/2020    HGB 9.2 12/25/2020    HCT 28.2 12/25/2020    MCV 97.6 12/25/2020    RDW 13.3 12/25/2020     12/25/2020       CMP:   Lab Results   Component Value Date     12/25/2020    K 4.8 12/25/2020     12/25/2020    CO2 25 12/25/2020    BUN 24 12/25/2020    CREATININE 0.88 12/25/2020    GFRAA >60 12/25/2020    LABGLOM >60 12/25/2020    GLUCOSE 176 12/25/2020    CALCIUM 8.7 12/25/2020       POC Tests:   Recent Labs     12/25/20  1105   POCGLU 146*       Coags:   Lab Results   Component Value Date    PROTIME 13.3 12/25/2020    INR 1.0 12/25/2020    APTT 29.3 12/24/2020       HCG (If Applicable): No results found for: PREGTESTUR, PREGSERUM, HCG, HCGQUANT     ABGs: No results found for: PHART, PO2ART, ATP7CFB, YHG1WEJ, BEART, T4MFQKTX     Type & Screen (If Applicable):  No results found for: LABABO, LABRH    Drug/Infectious Status (If Applicable):  No results found for: HIV, HEPCAB    COVID-19 Screening (If Applicable):   Lab Results   Component Value Date    COVID19 Not Detected 12/24/2020         Anesthesia Evaluation   no history of anesthetic complications:   Airway: Mallampati: II     Neck ROM: full  Mouth opening: > = 3 FB Dental:    (+) lower dentures and upper dentures      Pulmonary:Negative Pulmonary ROS and normal exam        (-) COPD and not a current smoker                           Cardiovascular:  Exercise tolerance: poor (<4 METS),   (+) hypertension:, murmur,     (-)  angina       Beta Blocker:  Not on Beta Blocker         Neuro/Psych: (+) psychiatric history (ocd):            GI/Hepatic/Renal:        (-) GERD       Endo/Other:    (+) Diabetes, . Abdominal:           Vascular:                                      Anesthesia Plan      spinal     ASA 3 - emergent     (Npo, no anticoags)  Induction: intravenous. MIPS: Postoperative opioids intended and Prophylactic antiemetics administered. Anesthetic plan and risks discussed with patient and child/children. Use of blood products discussed with patient whom consented to blood products. Plan discussed with CRNA.     Attending anesthesiologist reviewed and agrees with Cedrick Fitch MD   12/25/2020

## 2020-12-25 NOTE — ED NOTES
Daughter, Chery Alberto, called and updated on admission. Tim Lee is currently in [facility]. Plans to come to hospital early tomorrow morning to see mother. Hopes to see pt before possible surgery tomorrow. Requests medical-surgical nurse calls her with update, if she is not here at hospital before surgery.      Arminda Luevano RN  12/24/20 6406

## 2020-12-25 NOTE — ED NOTES
To room 2024 per stretcher. Granddaughter gone home. Clothing and cane with pt. [place] home with granddaughter. Bedside updates given to [facility], medical-surgical RN. Assisted getting pt to bed from stretcher while holding affected left leg. Tolerated well. Reassured.      Amador Amaya RN  12/25/20 1993

## 2020-12-25 NOTE — PLAN OF CARE
Problem: Pain:  Goal: Control of acute pain  Description: Control of acute pain  Outcome: Ongoing  Note: Pain level assessment complete. Patient educated on pain scale and control interventions  Morphine PRN pain medication given per patient request  Patient instructed to call out with new onset of pain or unrelieved pain  Will continue to monitor. Problem: Skin Integrity:  Goal: Will show no infection signs and symptoms  Description: Will show no infection signs and symptoms  Outcome: Ongoing     Problem: Falls - Risk of:  Goal: Will remain free from falls  Description: Will remain free from falls  Outcome: Ongoing  Note: Siderails up x 2  Hourly rounding  Call light in reach  Instructed to call for assist before attempting out of bed. Remains free from falls and accidental injury at this time   Floor free from obstacles  Bed is locked and in lowest position  Adequate lighting provided  Bed alarm on, [place] star and Stay with Me signs posted  Will continue to monitor.

## 2020-12-25 NOTE — H&P
[facility]  Office: [address], DO, Nicholas White, DO, Ny Baires, DO, Shashank Davisalexsander Blood, DO, Jesus Carcamo MD, Jhony Nava MD, Stanislaw Pineda MD, Milad Martinez MD, Wellington Vásquez MD, Hammad Azevedo MD, Katie Urena MD, Tony Ayala MD, Juliana Caballero MD, Barbara Linn DO, Yesenia Balderas MD, Lyla Castleman, MD, Nicho Weber DO, Edu Aggarwal MD,  Robina Abreu DO, Bibi Waite MD, Sandro Fu MD, Jaclyn Bradshaw, [facility], [facility], CNP, Rachael Martell, CNP, Ruth Mccoy, CNS, Lia Wang, CNP, Jet Barcenas, CNP, Carroll Taveras, CNP, Wilton Driscoll, CNP, Tonio Uribe, CNP, Sahara Saavedra PA-C, Shaye Berrios, [facility], Sam Ruff, CNP, Sudha Almaraz, CNP, Alan Spencer, CNP, Shirin Guidry, CNP, Vida Calles, CNP         [address]    HISTORY AND PHYSICAL EXAMINATION            Date:   12/25/2020  Patient name:  Juliann Dong  Date of admission:  12/24/2020  8:57 PM  MRN:   [id number]  Account:  [de-identified]  YOB: 1935  PCP:    Aimee Cordero MD  Room:   3105/4918-20  Code Status:    Full Code    Chief Complaint:     Chief Complaint   Patient presents with    Leg Pain     fell this evening       History Obtained From:     patient, electronic medical record    History of Present Illness:     Juliann Dong is a 81 y.o. Non-/non  female who presents with Leg Pain (fell this evening)   and is admitted to the hospital for the management of Closed displaced fracture of left femoral neck ([facility] [address] Patient presented to the emergency department after a fall with a chief complaint of left hip and leg pain. She states she was going down the steps when her shoe slipped and she fell. She did not hit her head or lose consciousness. The fall happened around 7 PM.  She denies any other injuries.   She has a history of hypertension, hyperlipidemia, OCD, diabetic neuropathy, mild cognitive impairment. X-ray of the hip demonstrates a comminuted displaced intertrochanteric left femoral fracture. Orthopedic surgery was consulted by the emergency department. She is being admitted for further management of closed displaced fracture of the left femoral neck. Past Medical History:     Past Medical History:   Diagnosis Date    Diabetes mellitus ([address]     Hyperlipidemia     Hypertension         Past Surgical History:     Past Surgical History:   Procedure Laterality Date    APPENDECTOMY      CHOLECYSTECTOMY          Medications Prior to Admission:     Prior to Admission medications    Medication Sig Start Date End Date Taking? Authorizing Provider   lisinopril (PRINIVIL;ZESTRIL) 10 MG tablet Take 10 mg by mouth daily   Yes Historical Provider, MD   METFORMIN HCL PO Take by mouth 2 times daily   Yes Historical Provider, MD   Atorvastatin Calcium (LIPITOR PO) Take by mouth daily   Yes Historical Provider, MD        Allergies:     Aleve [naproxen]    Social History:     Tobacco:    reports that she has never smoked. She has never used smokeless tobacco.  Alcohol:      reports no history of alcohol use. Drug Use:  reports no history of drug use. Family History:     History reviewed. No pertinent family history. Review of Systems:     Positive and Negative as described in HPI. Review of Systems   Constitutional: Negative. HENT: Negative. Eyes: Negative. Respiratory: Negative. Cardiovascular: Negative. Gastrointestinal: Negative. Endocrine: Negative. Genitourinary: Negative. Musculoskeletal: Positive for arthralgias and myalgias. Negative for back pain, gait problem, joint swelling, neck pain and neck stiffness. Skin: Negative. Allergic/Immunologic: Negative. Neurological: Negative. Hematological: Negative. Psychiatric/Behavioral: Negative.         Physical Exam:   BP (!) 132/53   Pulse 68   Temp 97.5 °F (36.4 °C) (Oral)   Resp 18   Ht 5' 5\" (1.651 m) leg: No edema. Comments: LLE externally rotated and shortened   Lymphadenopathy:      Cervical: No cervical adenopathy. Skin:     General: Skin is warm and dry. Capillary Refill: Capillary refill takes less than 2 seconds. Coloration: Skin is not jaundiced or pale. Findings: No bruising, erythema, lesion or rash. Neurological:      General: No focal deficit present. Mental Status: She is alert and oriented to person, place, and time. Sensory: No sensory deficit. Motor: No weakness.       Coordination: Coordination normal.   Psychiatric:         Mood and Affect: Mood normal.         Behavior: Behavior normal.         Investigations:      Laboratory Testing:  Recent Results (from the past 24 hour(s))   CBC with DIFF    Collection Time: 12/24/20 10:00 PM   Result Value Ref Range    WBC 6.0 3.5 - 11.3 k/uL    RBC 3.18 (L) 3.95 - 5.11 m/uL    Hemoglobin 10.1 (L) 11.9 - 15.1 g/dL    Hematocrit 31.6 (L) 36.3 - 47.1 %    MCV 99.4 82.6 - 102.9 fL    MCH 31.8 25.2 - 33.5 pg    MCHC 32.0 28.4 - 34.8 g/dL    RDW 13.5 11.8 - 14.4 %    Platelets 349 085 - 809 k/uL    MPV 9.2 8.1 - 13.5 fL    NRBC Automated 0.0 0.0 per 100 WBC    Differential Type NOT REPORTED     Seg Neutrophils 77 (H) 36 - 65 %    Lymphocytes 15 (L) 24 - 43 %    Monocytes 7 3 - 12 %    Eosinophils % 0 (L) 1 - 4 %    Basophils 1 0 - 2 %    Immature Granulocytes 0 0 %    Segs Absolute 4.62 1.50 - 8.10 k/uL    Absolute Lymph # 0.87 (L) 1.10 - 3.70 k/uL    Absolute Mono # 0.41 0.10 - 1.20 k/uL    Absolute Eos # <0.03 0.00 - 0.44 k/uL    Basophils Absolute 0.03 0.00 - 0.20 k/uL    Absolute Immature Granulocyte 0.02 0.00 - 0.30 k/uL    WBC Morphology NOT REPORTED     RBC Morphology NOT REPORTED     Platelet Estimate NOT REPORTED    Basic Metabolic Prof    Collection Time: 12/24/20 10:14 PM   Result Value Ref Range    Glucose 192 (H) 70 - 99 mg/dL    BUN 27 (H) 8 - 23 mg/dL    CREATININE 1.05 (H) 0.50 - 0.90 mg/dL    Bun/Cre Ratio 26 (H) 9 - 20    Calcium 9.2 8.6 - 10.4 mg/dL    Sodium 136 135 - 144 mmol/L    Potassium 4.7 3.7 - 5.3 mmol/L    Chloride 102 98 - 107 mmol/L    CO2 23 20 - 31 mmol/L    Anion Gap 11 9 - 17 mmol/L    GFR Non-African American 50 (L) >60 mL/min    GFR African American >60 >60 mL/min    GFR Comment          GFR Staging NOT REPORTED    Protime-INR    Collection Time: 12/24/20 10:14 PM   Result Value Ref Range    Protime 12.4 11.5 - 14.2 sec    INR 0.9    APTT    Collection Time: 12/24/20 10:14 PM   Result Value Ref Range    PTT 29.3 23.9 - 33.8 sec   COVID-19    Collection Time: 12/24/20 10:20 PM    Specimen: Other   Result Value Ref Range    SARS-CoV-2          SARS-CoV-2, Rapid Not Detected Not Detected    Source . NASOPHARYNGEAL SWAB     SARS-CoV-2         POC Glucose Fingerstick    Collection Time: 12/25/20 12:12 AM   Result Value Ref Range    POC Glucose 185 (H) 65 - 105 mg/dL   EKG 12 Lead    Collection Time: 12/25/20  2:50 AM   Result Value Ref Range    Ventricular Rate 70 BPM    Atrial Rate 58 BPM    QRS Duration 78 ms    Q-T Interval 370 ms    QTc Calculation (Bazett) 399 ms    R Axis -19 degrees    T Axis 140 degrees       Imaging/Diagnostics:  Xr Femur Left (min 2 Views)    Result Date: 12/24/2020  Comminuted, displaced intertrochanteric left femoral fracture. Xr Hip 2-3 Vw W Pelvis Left    Result Date: 12/24/2020  Comminuted, displaced intertrochanteric left femoral fracture. Assessment :      Hospital Problems           Last Modified POA    * (Principal) Closed displaced fracture of left femoral neck ([address] 12/25/2020 Yes    Diabetic neuropathy ([address] 12/25/2020 Yes    Essential hypertension 12/25/2020 Yes    Mixed hyperlipidemia 12/25/2020 Yes    Mild cognitive impairment 12/25/2020 Yes    Obsessive-compulsive disorder 12/25/2020 Yes          Plan:     Patient status inpatient in the  [facility]    1. Admit as inpatient to the [facility] unit under the internal medicine service  2.  Left femoral neck fracture: Consult Ortho surgery, morphine for pain management, maintain n.p.o. status, maintain strict bed rest  3. IV fluid 75 mL/h  4. Monitor labs of BMP, CBC, PT/INR  5. Replete electrolytes as needed  6. PT/OT evaluate and treat    Consultations:   IP CONSULT TO ORTHOPEDIC SURGERY  IP CONSULT TO HOSPITALIST  IP CONSULT TO PHARMACY    Patient is admitted as inpatient status because of co-morbidities listed above, severity of signs and symptoms as outlined, requirement for current medical therapies and most importantly because of direct risk to patient if care not provided in a hospital setting. Expected length of stay > 48 hours.     Rudi Lundborg, APRN - CNP  12/25/2020  6:03 AM    Copy sent to Dr. Candelaria Turpin MD

## 2020-12-25 NOTE — PLAN OF CARE
Problem: Pain:  Goal: Pain level will decrease  Description: Pain level will decrease  Outcome: Ongoing  Goal: Control of acute pain  Description: Control of acute pain  12/25/2020 1024 by Susanna Palma RN  Outcome: Ongoing  12/25/2020 0545 by Bolivar Parikh RN  Outcome: Ongoing  Note: Pain level assessment complete. Patient educated on pain scale and control interventions  Morphine PRN pain medication given per patient request  Patient instructed to call out with new onset of pain or unrelieved pain  Will continue to monitor. Goal: Control of chronic pain  Description: Control of chronic pain  Outcome: Completed     Problem: Skin Integrity:  Goal: Will show no infection signs and symptoms  Description: Will show no infection signs and symptoms  12/25/2020 1024 by Susanna Palma RN  Outcome: Ongoing  12/25/2020 0545 by Bolivar Parikh RN  Outcome: Ongoing  Goal: Absence of new skin breakdown  Description: Absence of new skin breakdown  Outcome: Ongoing     Problem: Falls - Risk of:  Goal: Will remain free from falls  Description: Will remain free from falls  12/25/2020 1024 by Susanna Palma RN  Outcome: Ongoing  12/25/2020 0545 by Bolivar Parikh RN  Outcome: Ongoing  Note: Siderails up x 2  Hourly rounding  Call light in reach  Remains free from falls and accidental injury at this time   Floor free from obstacles  Bed is locked and in lowest position  Adequate lighting provided  Bed alarm on, [place] star and Stay with Me signs posted  Will continue to monitor. Remains on bedrest waiting for surgery. Goal: Absence of physical injury  Description: Absence of physical injury  Outcome: Ongoing     Waiting on Dr Kayli Henriquez to clear for surgery today. Softcare mattress on bed. Taking morphine for pain with relief lasting 2-3 hours. Pt reluctant to move. Voided on bedpan. AM care given by the techs.

## 2020-12-25 NOTE — PROGRESS NOTES
Dr Tiffanie Corley notified of pt nausea and possible r/t morphine ( per recovery room nurse). Dr is deferring to attending.

## 2020-12-25 NOTE — PROGRESS NOTES
Pt admitted to room 2024 from ER. Oriented to room, call light and bed mechanics. Side rails up x2. Call light within reach. Orders reviewed. Pain 10/10.

## 2020-12-25 NOTE — PROGRESS NOTES
Pt returned from PACU per bed. Pt is alert but having some dry heaves upon return. IV site unremarkable. Left hip dressings intact with small 2x2 guaze and opsite. Most medial dressing has small amount of fresh pink drainage. Pt has full sensation to extremities. Telemetry and continuous pulse ox applied. Orders reviewed.

## 2020-12-25 NOTE — ED PROVIDER NOTES
EMERGENCY DEPARTMENT ENCOUNTER    Pt Name: Kimo Alston  MRN: [id number]  [place] 1935  Date of evaluation: 12/24/20  CHIEF COMPLAINT       Chief Complaint   Patient presents with    Leg Pain     fell this evening     HISTORY OF PRESENT ILLNESS   Patient is an 81-year-old female here after mechanical fall. She states she was going down the steps walks with a cane and slipped. She landed on her left side she is complaining of pain to the left hip and left lateral proximal thigh. Denies hitting her head denies loss of consciousness not on blood thinners. Last ate a few hours ago. Denies any other injuries or pain elsewhere. REVIEW OF SYSTEMS     Review of Systems   Constitutional: Negative for chills and fever. HENT: Negative for trouble swallowing. Eyes: Negative for visual disturbance. Respiratory: Negative for shortness of breath. Cardiovascular: Negative for chest pain. Gastrointestinal: Negative for abdominal pain. Genitourinary: Negative for difficulty urinating. Musculoskeletal: Positive for arthralgias and myalgias. Negative for neck pain. Skin: Negative for rash. Neurological: Negative for dizziness, weakness and light-headedness. Psychiatric/Behavioral: Negative for confusion. PASTMEDICAL HISTORY     Past Medical History:   Diagnosis Date    Diabetes mellitus ([address]     Hyperlipidemia     Hypertension      SURGICAL HISTORY       Past Surgical History:   Procedure Laterality Date    APPENDECTOMY      CHOLECYSTECTOMY       CURRENT MEDICATIONS       Previous Medications    ATORVASTATIN CALCIUM (LIPITOR PO)    Take by mouth daily    METFORMIN HCL PO    Take by mouth 2 times daily     ALLERGIES     is allergic to aleve [naproxen]. FAMILY HISTORY     has no family status information on file.       SOCIAL HISTORY       Social History     Tobacco Use    Smoking status: Never Smoker    Smokeless tobacco: Never Used   Substance Use Topics    Alcohol use: Never Frequency: Never    Drug use: Never     PHYSICAL EXAM     INITIAL VITALS: BP (!) 126/52   Pulse 59   Temp 97.5 °F (36.4 °C) (Oral)   Resp 18   Ht 5' 5\" (1.651 m)   Wt 198 lb (89.8 kg)   SpO2 100%   BMI 32.95 kg/m²    Physical Exam  Vitals signs and nursing note reviewed. Constitutional:       General: She is not in acute distress. Appearance: She is not ill-appearing, toxic-appearing or diaphoretic. HENT:      Head: Normocephalic and atraumatic. Mouth/Throat:      Mouth: Mucous membranes are moist.      Pharynx: Oropharynx is clear. Eyes:      Extraocular Movements: Extraocular movements intact. Pupils: Pupils are equal, round, and reactive to light. Neck:      Musculoskeletal: Normal range of motion and neck supple. Cardiovascular:      Rate and Rhythm: Normal rate and regular rhythm. Pulses:           Dorsalis pedis pulses are 2+ on the right side and 2+ on the left side. Posterior tibial pulses are 2+ on the right side and 2+ on the left side. Pulmonary:      Effort: Pulmonary effort is normal. No respiratory distress. Abdominal:      Palpations: Abdomen is soft. Tenderness: There is no abdominal tenderness. Musculoskeletal:         General: No deformity. Left hip: She exhibits decreased range of motion, decreased strength and bony tenderness. Comments: Left leg is shortened and externally rotated. She has tenderness over the lateral proximal thigh femur and hip. No tenderness to cervical thoracic or lumbar spine   Skin:     General: Skin is warm and dry. Capillary Refill: Capillary refill takes less than 2 seconds. Neurological:      General: No focal deficit present. Mental Status: She is alert and oriented to person, place, and time. Psychiatric:         Thought Content: Thought content normal.         MEDICAL DECISION MAKING:          Please see ED Course below for MDM/ED course.     DDx: Fracture, dislocation    All patient's question's and concerns were answered prior to disposition and patient and/or family expressed understanding and agreement of treatment plan. NIH STROKE SCALE:            PROCEDURES:    Procedures    DIAGNOSTIC RESULTS   EKG:All EKG's are interpreted by the Emergency Department Physician who either signs or Co-signs this chart in the absence of a cardiologist.    RADIOLOGY:All plain film, CT, MRI, and formal ultrasound images (except ED bedside ultrasound) are read by the radiologist, see reports below, unless otherwisenoted in MDM or here. XR HIP 2-3 VW W PELVIS LEFT   Final Result   Comminuted, displaced intertrochanteric left femoral fracture. XR FEMUR LEFT (MIN 2 VIEWS)   Final Result   Comminuted, displaced intertrochanteric left femoral fracture. LABS: All lab results were reviewed by myself, and all abnormals are listed below. Labs Reviewed   CBC WITH AUTO DIFFERENTIAL - Abnormal; Notable for the following components:       Result Value    RBC 3.18 (*)     Hemoglobin 10.1 (*)     Hematocrit 31.6 (*)     Seg Neutrophils 77 (*)     Lymphocytes 15 (*)     Eosinophils % 0 (*)     Absolute Lymph # 0.87 (*)     All other components within normal limits   BASIC METABOLIC PANEL - Abnormal; Notable for the following components:    Glucose 192 (*)     BUN 27 (*)     CREATININE 1.05 (*)     Bun/Cre Ratio 26 (*)     GFR Non- 50 (*)     All other components within normal limits   PROTIME-INR   APTT   COVID-19       EMERGENCY DEPARTMENTCOURSE:     Patient is an 61-year-old female here after mechanical fall with left hip pain. Left leg is shortened externally rotated. Pulses are intact. No other injuries on exam.  Will x-ray, treat her pain. X-ray shows comminuted displaced left intertrochanteric fracture. Spoke with Dr. Zaira Ramos orthopedics. Will admit to medicine. Spoke with John Gillespie who will admit. Will get basic blood work coags and Covid swab.     Vitals:    Vitals: 12/24/20 2051   BP: (!) 126/52   Pulse: 59   Resp: 18   Temp: 97.5 °F (36.4 °C)   TempSrc: Oral   SpO2: 100%   Weight: 198 lb (89.8 kg)   Height: 5' 5\" (1.651 m)       The patient was given the following medications while in the emergency department:  Orders Placed This Encounter   Medications    acetaminophen (TYLENOL) tablet 650 mg     CONSULTS:  [address] TO HOSPITALIST    FINAL IMPRESSION      1. Closed fracture of left hip, initial encounter [facility])          DISPOSITION/PLAN   DISPOSITION Admitted 12/24/2020 10:22:03 PM      PATIENT REFERRED TO:  No follow-up provider specified. DISCHARGE MEDICATIONS:  New Prescriptions    No medications on file     Alo Leiva MD  Attending Emergency Physician    This note was created with the assistance of a speech-recognition program. While intending to generate a document that actually reflects the content of the visit, no guarantees can be provided that every mistake has been identified and corrected by editing.                    Alo Leiva MD  12/24/20 2838

## 2020-12-25 NOTE — PROGRESS NOTES
[facility]  Office: [phone]  Boris Yanez, DO, Nicole Goldberg, DO, Jakub Gandara, DO, Griselda De, DO, Emerson Escobar MD, Marvin Ladd MD, Warren Wilks MD, Buzz Bryson MD, Cata Estrada MD, Karly Cardoza MD, Roberto Stewart MD, Pro Hess MD, Juliana Uribe MD, Nasima Martin DO, Bonny Garcia MD, Adelina Finley MD, Chris Lugo, DO, Raman Costello MD,  Beatrice Guzman DO, Giovany Moscoso MD, Satish Cano MD, Courtney Sweeney [facility], [facility], CNP, Amador Kyle, CNP, Ronnie Pelaez, CNS, Lathan Pallas, CNP, Christian Lott, CNP, Marisol Gomez, CNP, Adalid Gill, CNP, Tosha Gallego, CNP, Dakota Doherty PA-C, Jose Howard, [facility], Robyn Gamino, CNP, Dayanara Arce, CNP, Portia Eisenberg, CNP, Lakeshia Albarran, CNP, León Jackson, CNP         [facility]    Progress Note    12/25/2020    2:32 PM    Name:   Fabi Reyes  MRN:     [id number]     Acct:      [de-identified]   Room:   [facility] OR [place]/NONE  IP Day:  1  Admit Date:  12/24/2020  8:57 PM    PCP:   Ave Sidhu MD  Code Status:  Full Code    Subjective:     C/C:   Chief Complaint   Patient presents with    Leg Pain     fell this evening     Interval History Status:   She is n.p.o. for left hip surgery today  Denies pain at this time  EKG showing junctional rhythm  No previous EKG to compare, no echo available  Brief History:   Fabi Reyes is a 81 y.o. Non-/non  female who presents with Leg Pain (fell this evening)   and is admitted to the hospital for the management of Closed displaced fracture of left femoral neck ([facility] [address]    Patient presented to the emergency department after a fall with a chief complaint of left hip and leg pain. She states she was going down the steps when her shoe slipped and she fell. She did not hit her head or lose consciousness. The fall happened around 7 PM.  She denies any other injuries.   She has a history of hypertension, hyperlipidemia, OCD, diabetic neuropathy, mild cognitive impairment. X-ray of the hip demonstrates a comminuted displaced intertrochanteric left femoral fracture. Orthopedic surgery was consulted by the emergency department. She is being admitted for further management of closed displaced fracture of the left femoral neck. Review of Systems:     Constitutional:  negative for chills, fevers, sweats  Respiratory:  negative for cough, dyspnea on exertion, shortness of breath, wheezing  Cardiovascular:  negative for chest pain, chest pressure/discomfort, lower extremity edema, palpitations  Gastrointestinal:  negative for abdominal pain, constipation, diarrhea, nausea, vomiting  Neurological:  negative for dizziness, headache    Medications: Allergies: Allergies   Allergen Reactions    Aleve [Naproxen] Swelling       Current Meds:   Scheduled Meds:    [MAR Hold] sodium chloride flush  10 mL Intravenous 2 times per day    [MAR Hold] enoxaparin  40 mg Subcutaneous Daily    [MAR Hold] diphenhydrAMINE  25 mg Intravenous Once     Continuous Infusions:    [MAR Hold] sodium chloride 75 mL/hr at 12/25/20 0455     PRN Meds: [MAR Hold] sodium chloride flush, [MAR Hold] potassium chloride **OR** [MAR Hold] potassium alternative oral replacement **OR** [MAR Hold] potassium chloride, [MAR Hold] magnesium sulfate, [MAR Hold] promethazine **OR** [MAR Hold] ondansetron, [MAR Hold] polyethylene glycol, [MAR Hold] nicotine, [MAR Hold] acetaminophen **OR** [MAR Hold] acetaminophen, [MAR Hold] morphine **OR** [MAR Hold] morphine, [MAR Hold] fentanNYL, [MAR Hold] HYDROmorphone, [MAR Hold] ondansetron    Data:     Past Medical History:   has a past medical history of Diabetes mellitus ([facility] [address] Hyperlipidemia, and Hypertension. Social History:   reports that she has never smoked. She has never used smokeless tobacco. She reports that she does not drink alcohol or use drugs. Family History: History reviewed.  No pertinent family history. Vitals:  BP (!) 129/53   Pulse 62   Temp 97.6 °F (36.4 °C) (Oral)   Resp 18   Ht 5' 5\" (1.651 m)   Wt 198 lb 4.8 oz (89.9 kg)   SpO2 97%   BMI 33.00 kg/m²   Temp (24hrs), Av.5 °F (36.4 °C), Min:97.5 °F (36.4 °C), Max:97.6 °F (36.4 °C)    Recent Labs     20  1105   POCGLU 185* 153* 146*       I/O (24Hr): No intake or output data in the 24 hours ending 20 1432    Labs:  Hematology:  Recent Labs     20  0659   WBC 6.0  --  3.8   RBC 3.18*  --  2.89*   HGB 10.1*  --  9.2*   HCT 31.6*  --  28.2*   MCV 99.4  --  97.6   MCH 31.8  --  31.8   MCHC 32.0  --  32.6   RDW 13.5  --  13.3     --  163   MPV 9.2  --  9.0   INR  --  0.9 1.0     Chemistry:  Recent Labs     2059 20  1008    138  --    K 4.7 4.8  --     106  --    CO2 23 25  --    GLUCOSE 192* 176*  --    BUN 27* 24*  --    CREATININE 1.05* 0.88  --    ANIONGAP 11 7*  --    LABGLOM 50* >60  --    GFRAA >60 >60  --    CALCIUM 9.2 8.7  --    TROPHS  --   --  12     Recent Labs     20  1105   POCGLU 185* 153* 146*     ABG:No results found for: POCPH, PHART, PH, POCPCO2, KSS6AUX, PCO2, POCPO2, PO2ART, PO2, POCHCO3, BGT4YYP, HCO3, NBEA, PBEA, BEART, BE, THGBART, THB, RUM0JEM, XQDD7UTV, I5PIHKFQ, O2SAT, FIO2  No results found for: SPECIAL  No results found for: CULTURE    Radiology:  Xr Femur Left (min 2 Views)    Result Date: 2020  Comminuted, displaced intertrochanteric left femoral fracture. Xr Hip 2-3 Vw W Pelvis Left    Result Date: 2020  Comminuted, displaced intertrochanteric left femoral fracture.        Physical Examination:        General appearance:  alert, cooperative and no distress at present  Mental Status:  oriented to person, place and time and normal affect  Lungs:  clear to auscultation bilaterally, normal effort  Heart:  regular rate and rhythm, + systolic murmur best heard in A1 area  Abdomen:  soft, nontender, nondistended, normal bowel sounds, no masses, hepatomegaly, splenomegaly  Extremities:  + External rotation of left lower extremity, no edema, redness, tenderness in the calves  Skin:  no gross lesions, rashes, induration    Assessment:        Hospital Problems           Last Modified POA    * (Principal) Closed displaced fracture of left femoral neck ([facility] [address] 12/25/2020 Yes    Diabetic neuropathy ([facility] [address] 12/25/2020 Yes    Essential hypertension 12/25/2020 Yes    Mixed hyperlipidemia 12/25/2020 Yes    Mild cognitive impairment 12/25/2020 Yes    Obsessive-compulsive disorder 12/25/2020 Yes    Moderate aortic stenosis 12/25/2020 Yes      Abnormal EKG    Plan:        1. Check cardiac enzymes  2. 2D echo  3.  Consult cardiology for clearance as surgery is anticipated today for left fracture    Pam Mott MD  12/25/2020  2:32 PM

## 2020-12-25 NOTE — CARE COORDINATION
Case Management Initial Discharge Plan  Irlandacarol Anshu,             Met with:patient or family member patient and daughter to discuss discharge plans. Information verified: address, contacts, phone number, , insurance Yes  PCP: Nathan Wyman MD  Date of last visit: Summer 2020    Insurance Provider: [address] [place] [address]    Discharge Planning    Living Arrangements:  Alone   Support Systems:  Children, Family Members, Home Care Staff, Meals on Wheels(moms meals every tuesday)    Lives in 1st Floor [address]  1 stair to climb to get into front door, 0 stairs to climb to reach second floor  Location of bedroom/bathroom in home Main floor    Patient able to perform ADL's:Independent    Current Services (outpatient & in home) [address]  DME equipment: cane, raised TS, walker, glucometer  DME provider: N/A    Pharmacy: [facility] [place]    Potential Assistance Needed:  Martin Cazares, Outpatient PT/OT, Home Care    Patient agreeable to home care: Yes  Freedom of choice provided:  yes    Prior SNF/Rehab Placement and Facility: yes, in [facility] to SNF/Rehab: Yes  Morgan of choice provided: yes   Evaluation: yes    Expected Discharge date:  20  Patient expects to be discharged to:  home vs. rehab  Follow Up Appointment: Best Day/ Time: Monday AM    Transportation provider: Calli d/t transport to SNF  Transportation arrangements needed for discharge: Yes    Readmission Risk              Risk of Unplanned Readmission:        11             Does patient have a readmission risk score greater than 14?: No  If yes, follow-up appointment must be made within 7 days of discharge. Goal of Care:       Discharge Plan:     Met with patient and daughter Ivy Alcantara ([phone]) at bedside to discuss d/c plan. Patient admitted with a hip fracture s/p fall . Left hip ORIF   Patient lives alone and ambulates with a cane, no longer drives.     Patient and daughter both agree that SNF will be the best option at discharge. SNF List provided to patient. Choices are as follow:  1) [facility]  2) [facility]    Attempted to send referral to [facility], but unable to reach anyone by phone. Prior to admission patient was receiving services from [facility] ([phone]). She had someone coming to the home M-W-F for approximately 3h/day. Continue to follow and will need to send referrals to [facility] and [facility]. Please contact Lane Yap (daughter) with any questions. The Plan for Transition of Care is related to the following treatment goals: SN, PT/OT    The Patient and/or patient representative was provided with a choice of provider and agrees   with the discharge plan. [x] Yes [] No    Freedom of choice list was provided with basic dialogue that supports the patient's individualized plan of care/goals, treatment preferences and shares the quality data associated with the providers.  [x] Yes [] No            Electronically signed by Charis Bagley RN on 12/25/20 at 2:02 PM EST

## 2020-12-26 LAB
GLUCOSE BLD-MCNC: 136 MG/DL (ref 65–105)
GLUCOSE BLD-MCNC: 138 MG/DL (ref 65–105)
GLUCOSE BLD-MCNC: 168 MG/DL (ref 65–105)
GLUCOSE BLD-MCNC: 209 MG/DL (ref 65–105)
HCT VFR BLD CALC: 24.2 % (ref 36.3–47.1)
HEMOGLOBIN: 7.5 G/DL (ref 11.9–15.1)
MCH RBC QN AUTO: 31.3 PG (ref 25.2–33.5)
MCHC RBC AUTO-ENTMCNC: 31 G/DL (ref 28.4–34.8)
MCV RBC AUTO: 100.8 FL (ref 82.6–102.9)
NRBC AUTOMATED: 0 PER 100 WBC
PDW BLD-RTO: 13.6 % (ref 11.8–14.4)
PLATELET # BLD: 145 K/UL (ref 138–453)
PMV BLD AUTO: 9.5 FL (ref 8.1–13.5)
RBC # BLD: 2.4 M/UL (ref 3.95–5.11)
WBC # BLD: 3.4 K/UL (ref 3.5–11.3)

## 2020-12-26 PROCEDURE — 97162 PT EVAL MOD COMPLEX 30 MIN: CPT

## 2020-12-26 PROCEDURE — 97535 SELF CARE MNGMENT TRAINING: CPT

## 2020-12-26 PROCEDURE — 99232 SBSQ HOSP IP/OBS MODERATE 35: CPT | Performed by: INTERNAL MEDICINE

## 2020-12-26 PROCEDURE — 1200000000 HC SEMI PRIVATE

## 2020-12-26 PROCEDURE — 6370000000 HC RX 637 (ALT 250 FOR IP): Performed by: ORTHOPAEDIC SURGERY

## 2020-12-26 PROCEDURE — 36415 COLL VENOUS BLD VENIPUNCTURE: CPT

## 2020-12-26 PROCEDURE — 93306 TTE W/DOPPLER COMPLETE: CPT

## 2020-12-26 PROCEDURE — 6370000000 HC RX 637 (ALT 250 FOR IP): Performed by: INTERNAL MEDICINE

## 2020-12-26 PROCEDURE — 85027 COMPLETE CBC AUTOMATED: CPT

## 2020-12-26 PROCEDURE — 97530 THERAPEUTIC ACTIVITIES: CPT

## 2020-12-26 PROCEDURE — 82947 ASSAY GLUCOSE BLOOD QUANT: CPT

## 2020-12-26 PROCEDURE — 6360000002 HC RX W HCPCS: Performed by: STUDENT IN AN ORGANIZED HEALTH CARE EDUCATION/TRAINING PROGRAM

## 2020-12-26 PROCEDURE — 97116 GAIT TRAINING THERAPY: CPT

## 2020-12-26 PROCEDURE — 97166 OT EVAL MOD COMPLEX 45 MIN: CPT

## 2020-12-26 PROCEDURE — 2580000003 HC RX 258: Performed by: STUDENT IN AN ORGANIZED HEALTH CARE EDUCATION/TRAINING PROGRAM

## 2020-12-26 RX ORDER — ATORVASTATIN CALCIUM 20 MG/1
20 TABLET, FILM COATED ORAL DAILY
COMMUNITY

## 2020-12-26 RX ORDER — DEXTROSE MONOHYDRATE 50 MG/ML
100 INJECTION, SOLUTION INTRAVENOUS PRN
Status: DISCONTINUED | OUTPATIENT
Start: 2020-12-26 | End: 2020-12-30 | Stop reason: HOSPADM

## 2020-12-26 RX ORDER — ATORVASTATIN CALCIUM 20 MG/1
20 TABLET, FILM COATED ORAL DAILY
Status: DISCONTINUED | OUTPATIENT
Start: 2020-12-26 | End: 2020-12-30 | Stop reason: HOSPADM

## 2020-12-26 RX ORDER — DEXTROSE MONOHYDRATE 25 G/50ML
12.5 INJECTION, SOLUTION INTRAVENOUS PRN
Status: DISCONTINUED | OUTPATIENT
Start: 2020-12-26 | End: 2020-12-30 | Stop reason: HOSPADM

## 2020-12-26 RX ORDER — NICOTINE POLACRILEX 4 MG
15 LOZENGE BUCCAL PRN
Status: DISCONTINUED | OUTPATIENT
Start: 2020-12-26 | End: 2020-12-30 | Stop reason: HOSPADM

## 2020-12-26 RX ADMIN — ATORVASTATIN CALCIUM 20 MG: 20 TABLET, FILM COATED ORAL at 19:52

## 2020-12-26 RX ADMIN — CEFAZOLIN 2 G: 10 INJECTION, POWDER, FOR SOLUTION INTRAVENOUS at 01:11

## 2020-12-26 RX ADMIN — METFORMIN HYDROCHLORIDE 500 MG: 500 TABLET ORAL at 16:28

## 2020-12-26 RX ADMIN — INSULIN LISPRO 2 UNITS: 100 INJECTION, SOLUTION INTRAVENOUS; SUBCUTANEOUS at 12:31

## 2020-12-26 RX ADMIN — MORPHINE SULFATE 4 MG: 4 INJECTION, SOLUTION INTRAMUSCULAR; INTRAVENOUS at 19:32

## 2020-12-26 RX ADMIN — APIXABAN 2.5 MG: 2.5 TABLET, FILM COATED ORAL at 19:52

## 2020-12-26 RX ADMIN — INSULIN LISPRO 1 UNITS: 100 INJECTION, SOLUTION INTRAVENOUS; SUBCUTANEOUS at 16:27

## 2020-12-26 RX ADMIN — Medication 10 ML: at 19:32

## 2020-12-26 RX ADMIN — ENOXAPARIN SODIUM 40 MG: 100 INJECTION SUBCUTANEOUS at 07:52

## 2020-12-26 ASSESSMENT — PAIN DESCRIPTION - PROGRESSION: CLINICAL_PROGRESSION: GRADUALLY WORSENING

## 2020-12-26 ASSESSMENT — PAIN SCALES - GENERAL
PAINLEVEL_OUTOF10: 10
PAINLEVEL_OUTOF10: 0
PAINLEVEL_OUTOF10: 0

## 2020-12-26 ASSESSMENT — PAIN DESCRIPTION - ONSET: ONSET: ON-GOING

## 2020-12-26 ASSESSMENT — PAIN DESCRIPTION - LOCATION: LOCATION: HIP

## 2020-12-26 ASSESSMENT — PAIN DESCRIPTION - DESCRIPTORS: DESCRIPTORS: ACHING;THROBBING;SHARP

## 2020-12-26 ASSESSMENT — PAIN DESCRIPTION - FREQUENCY: FREQUENCY: INTERMITTENT

## 2020-12-26 NOTE — ANESTHESIA POSTPROCEDURE EVALUATION
Department of Anesthesiology  Postprocedure Note    Patient: Mita Martel  MRN: [id number]  YOB: 1935  Date of evaluation: 12/26/2020  Time:  2:29 PM     Procedure Summary     Date: 12/25/20 Room / Location: [place] [id number] / [facility] - INPATIENT    Anesthesia Start: 1851 Anesthesia Stop: 1519    Procedure: HIP OPEN REDUCTION INTERNAL FIXATION (Left Hip) Diagnosis: (LEFT HIP FRACTURE)    Surgeons: Marylin Monterroso MD Responsible Provider: Nga Su MD    Anesthesia Type: spinal ASA Status: 3 - Emergent          Anesthesia Type: spinal    Desiree Phase I: Desiree Score: 10    Desiree Phase II:      Last vitals: Reviewed and per EMR flowsheets.        Anesthesia Post Evaluation    Patient location during evaluation: PACU  Patient participation: complete - patient participated  Level of consciousness: awake  Airway patency: patent  Nausea & Vomiting: no nausea  Complications: no  Cardiovascular status: blood pressure returned to baseline  Respiratory status: acceptable  Hydration status: euvolemic

## 2020-12-26 NOTE — PLAN OF CARE
Problem: Pain:  Goal: Pain level will decrease  Description: Pain level will decrease  12/26/2020 1118 by Selena Samuel RN  Outcome: Ongoing  Note: Pt medicated with pain medication prn. Assessed all pain characteristics including level, type, location, frequency, and onset. Non-pharmacologic interventions offered to pt as well. Pt states pain is tolerable at this time. Will continue to monitor      Problem: Falls - Risk of:  Goal: Will remain free from falls  Description: Will remain free from falls  12/26/2020 1118 by Selena Samuel RN  Outcome: Ongoing  Note: Patient is a fall risk during this admission. Fall risk assessment was performed. Patient is absent of falls. Bed is in the lowest position. Wheels on the bed are locked. Call light and bed side table are within reach. Clutter is removed. Patient was educated to call out when needing assistance or wanting to get out of bed. Patient offered toileting assistance during rounding. Hourly rounds have been performed.

## 2020-12-26 NOTE — PROGRESS NOTES
Orthopaedic Progress Note      SUBJECTIVE   Ms. Keely Fung is post op day # 1   denies pain. Patient notes her pain is at a minimum, she feels that she is doing well postoperatively. OBJECTIVE      Physical    VITALS:  BP (!) 131/37   Pulse 93   Temp 98.2 °F (36.8 °C) (Oral)   Resp 18   Ht 5' 5\" (1.651 m)   Wt 204 lb 3.2 oz (92.6 kg)   SpO2 96%   BMI 33.98 kg/m²   INTAKE/OUTPUT:    Intake/Output Summary (Last 24 hours) at 2020 1150  Last data filed at 2020 0800  Gross per 24 hour   Intake 3492 ml   Output 275 ml   Net 3217 ml     TEMPERATURE:  Current - Temp: 98.2 °F (36.8 °C); Max - Temp  Av.9 °F (36.6 °C)  Min: 97.2 °F (36.2 °C)  Max: 99.3 °F (37.4 °C)  RESPIRATIONS RANGE: Resp  Av.9  Min: 0  Max: 20  PULSE RANGE: Pulse  Av.5  Min: 56  Max: 93  BLOOD PRESSURE RANGE:  Systolic (35MEB), SHZ:533 , Min:80 , SQL:059   ; Diastolic (46AYC), BPI:82, Min:37, Max:92    I/O last 3 completed shifts: In: 4436 [I.V.:3252]  Out: 275 [Urine:125; Blood:150]      Patient is awake and oriented x3.   The proximal incision has some slight drainage but otherwise her dressings are clean dry and intact  She is neurovascularly intact the entire left lower extremity  Compartments are soft      Data  CBC:   Lab Results   Component Value Date    WBC 3.4 2020    HGB 7.5 2020     2020     BMP:    Lab Results   Component Value Date     2020    K 4.8 2020     2020    CO2 25 2020    BUN 24 2020    CREATININE 0.88 2020    CALCIUM 8.7 2020    GLUCOSE 176 2020     Uric Acid:  No components found for: URIC  PT/INR:    Lab Results   Component Value Date    PROTIME 13.3 2020    INR 1.0 2020     Troponin:  No results found for: TROPONINI  Urine Culture:  No components found for: AMRIK      Current Inpatient Medications    Current Facility-Administered Medications: insulin lispro (HUMALOG) injection vial 0-6 Units, 0-6 Units, Subcutaneous, TID WC  insulin lispro (HUMALOG) injection vial 0-3 Units, 0-3 Units, Subcutaneous, Nightly  glucose (GLUTOSE) 40 % oral gel 15 g, 15 g, Oral, PRN  dextrose 50 % IV solution, 12.5 g, Intravenous, PRN  glucagon (rDNA) injection 1 mg, 1 mg, Intramuscular, PRN  dextrose 5 % solution, 100 mL/hr, Intravenous, PRN  sodium chloride flush 0.9 % injection 10 mL, 10 mL, Intravenous, 2 times per day  sodium chloride flush 0.9 % injection 10 mL, 10 mL, Intravenous, PRN  potassium chloride (KLOR-CON M) extended release tablet 40 mEq, 40 mEq, Oral, PRN **OR** potassium bicarb-citric acid (EFFER-K) effervescent tablet 40 mEq, 40 mEq, Oral, PRN **OR** potassium chloride 10 mEq/100 mL IVPB (Peripheral Line), 10 mEq, Intravenous, PRN  magnesium sulfate 1 g in dextrose 5% 100 mL IVPB, 1 g, Intravenous, PRN  enoxaparin (LOVENOX) injection 40 mg, 40 mg, Subcutaneous, Daily  promethazine (PHENERGAN) tablet 12.5 mg, 12.5 mg, Oral, Q6H PRN **OR** ondansetron (ZOFRAN) injection 4 mg, 4 mg, Intravenous, Q6H PRN  polyethylene glycol (GLYCOLAX) packet 17 g, 17 g, Oral, Daily PRN  nicotine (NICODERM CQ) 21 MG/24HR 1 patch, 1 patch, Transdermal, Daily PRN  acetaminophen (TYLENOL) tablet 650 mg, 650 mg, Oral, Q6H PRN **OR** acetaminophen (TYLENOL) suppository 650 mg, 650 mg, Rectal, Q6H PRN  diphenhydrAMINE (BENADRYL) injection 25 mg, 25 mg, Intravenous, Once  morphine (PF) injection 2 mg, 2 mg, Intravenous, Q3H PRN **OR** morphine sulfate (PF) injection 4 mg, 4 mg, Intravenous, Q3H PRN  0.9 % sodium chloride infusion, , Intravenous, Continuous        PLAN    Plan on patient discharge to rehab within the next day or 2  Continue to watch her hemoglobin. If she becomes symptomatic consider transfusion  She will follow-up with me in my clinic in 2 weeks with x-rays  She is weightbearing as tolerated  I am going to discontinue the Lovenox and start her on Eliquis 2.5 mg 1 p.o. twice daily for 28 days.     Antonio Zhang MD

## 2020-12-26 NOTE — PROGRESS NOTES
[facility]   Section of Cardiology  Progress Note      Date:  12/26/2020  Patient: Marie Curtis  Admission:  12/24/2020  8:57 PM  Admit DX: Closed displaced fracture of left femoral neck ([address] Celester Ser  Age:  81 y.o., 1935                           LOS: 2 days     Reason for evaluation:   Aortic stenosis    SUBJECTIVE:     The patient was seen and examined. Notes and labs reviewed. There were not complications over night. She had surgery done yesterday and has improved. Patient's cardiac review of systems: negative. The patient is generally feeling rapidly improving. OBJECTIVE:    BP (!) 131/37   Pulse 93   Temp 98.2 °F (36.8 °C) (Oral)   Resp 18   Ht 5' 5\" (1.651 m)   Wt 204 lb 3.2 oz (92.6 kg)   SpO2 96%   BMI 33.98 kg/m²     Intake/Output Summary (Last 24 hours) at 12/26/2020 1540  Last data filed at 12/26/2020 0800  Gross per 24 hour   Intake 2492 ml   Output 125 ml   Net 2367 ml       EXAM:   CONSTITUTIONAL:  awake, alert, cooperative, no apparent distress, and appears stated age. HEENT: Normal jugular venous pulsations, no carotid bruits. Head is atraumatic, normocephalic. Eyes and oral mucosa are normal.  LUNGS: Good respiratory effort. No for increased work of breathing. On auscultation: clear to auscultation bilaterally  CARDIOVASCULAR:  Normal apical impulse, regular rate and rhythm, normal S1 and S2, no S3 or S4, and no murmur or rub noted. ABDOMEN: Soft, nontender, nondistended. Bowel sounds present. No masses or tenderness. SKIN: Warm and dry. EXTREMITIES: No lower extremity edema. Motor movement grossly intact. No cyanosis or clubbing.     Current Inpatient Medications:   insulin lispro  0-6 Units Subcutaneous TID WC    insulin lispro  0-3 Units Subcutaneous Nightly    apixaban  2.5 mg Oral BID    sodium chloride flush  10 mL Intravenous 2 times per day    diphenhydrAMINE  25 mg Intravenous Once       IV Infusions (if any):   dextrose      sodium chloride 75 mL/hr at 12/25/20 1740       Diagnostics:   Telemetry: Sinus    Labs:   CBC:   Recent Labs     12/25/20  0659 12/26/20  0858   WBC 3.8 3.4*   HGB 9.2* 7.5*   HCT 28.2* 24.2*    145     BMP:   Recent Labs     12/24/20 2214 12/25/20  0659    138   K 4.7 4.8   CO2 23 25   BUN 27* 24*   CREATININE 1.05* 0.88   LABGLOM 50* >60   GLUCOSE 192* 176*     BNP: No results for input(s): BNP in the last 72 hours. PT/INR:   Recent Labs     12/24/20 2214 12/25/20  0659   PROTIME 12.4 13.3   INR 0.9 1.0     APTT:  Recent Labs     12/24/20 2214   APTT 29.3     CARDIAC ENZYMES:No results for input(s): CKTOTAL, CKMB, CKMBINDEX, TROPONINI in the last 72 hours. FASTING LIPID PANEL:No results found for: HDL, LDLDIRECT, LDLCALC, TRIG  LIVER PROFILE:No results for input(s): AST, ALT, LABALBU in the last 72 hours. ASSESSMENT:    Patient Active Problem List   Diagnosis    Diabetic neuropathy ([facility] [address]    Essential hypertension    Mixed hyperlipidemia    Mild cognitive impairment    Obsessive-compulsive disorder    Closed displaced fracture of left femoral neck (HCC)    Moderate aortic stenosis       PLAN:    1. Aortic stenosis  2. Preoperative cardiac evaluation  Patient is doing well from a cardiology standpoint. Ok to discharge. Cardiology will sign off  Please see orders. Discussed with patient and nursing.     Artemio Hill MD

## 2020-12-26 NOTE — PROGRESS NOTES
[facility]  Office: [phone]  Emil Do DO, Indu Suazo DO, Tom Blevins DO, Corey De, DO, Desiree Cunningham MD, Ewa Ervin MD, Shiela Holloway MD, Sandra Richardson MD, Krista Covington MD, Braulio Porras MD, Sharma Babinski, MD, Rob Ocampo MD, Juliana Grimes MD, Trevin Wu DO, Saravanan Cohen MD, Isa Pope MD, Juan Diego Lopez DO, Regina Anthony MD,  Edi White DO, Jimmy Crawford MD, Shawn Gilmore MD, Michael Whal, [facility], [facility], [facility], Andre Gomez, CNP, Patsy Patterson, CNS, Concepcion Whiting, CNP, Gely Polanco, CNP, Ny Ray, CNP, Molly Gerardo, CNP, Richy Porter, CNP, Lawanda Mosqueda PA-C, Dede Wise, [facility], Agustin Thomas, CNP, Jey Cuellar, CNP, Elbert Ceballos, CNP, Félix Pepper, CNP, Stefan Gauthier, [facility] 137    Progress Note    12/26/2020    3:57 PM    Name:   Rafael Mccabe  MRN:     [id number]     Acct:      [de-identified]   Room:   2024/2024-01   Day:  2  Admit Date:  12/24/2020  8:57 PM    PCP:   Chay Santoro MD  Code Status:  Full Code    Subjective:     C/C:   Chief Complaint   Patient presents with    Leg Pain     fell this evening     Interval History Status:   Patient had left hip surgery yesterday  Blood glucose 153-209, A1c is pending  Denies pain at this time  Echo is not done yet  EKG was showing junctional rhythm  Brief History:   Rafael Mccabe is a 81 y.o. Non-/non  female who presents with Leg Pain (fell this evening)   and is admitted to the hospital for the management of Closed displaced fracture of left femoral neck ([facility] [address]    Patient presented to the emergency department after a fall with a chief complaint of left hip and leg pain. She states she was going down the steps when her shoe slipped and she fell. She did not hit her head or lose consciousness. The fall happened around 7 PM.  She denies any other injuries.   She has a history of hypertension, 5' 5\" (1.651 m)   Wt 204 lb 3.2 oz (92.6 kg)   SpO2 95%   BMI 33.98 kg/m²   Temp (24hrs), Av °F (36.7 °C), Min:97.3 °F (36.3 °C), Max:99.3 °F (37.4 °C)    Recent Labs     20  1520 20  2111 20  0727 20  1122   POCGLU 139* 153* 138* 209*       I/O (24Hr): Intake/Output Summary (Last 24 hours) at 2020 1557  Last data filed at 2020 0800  Gross per 24 hour   Intake 2492 ml   Output 125 ml   Net 2367 ml       Labs:  Hematology:  Recent Labs     20  0659 20  0858   WBC 6.0  --  3.8 3.4*   RBC 3.18*  --  2.89* 2.40*   HGB 10.1*  --  9.2* 7.5*   HCT 31.6*  --  28.2* 24.2*   MCV 99.4  --  97.6 100.8   MCH 31.8  --  31.8 31.3   MCHC 32.0  --  32.6 31.0   RDW 13.5  --  13.3 13.6     --  163 145   MPV 9.2  --  9.0 9.5   INR  --  0.9 1.0  --      Chemistry:  Recent Labs     20  0659 20  1008 20  1947    138  --   --    K 4.7 4.8  --   --     106  --   --    CO2 23 25  --   --    GLUCOSE 192* 176*  --   --    BUN 27* 24*  --   --    CREATININE 1.05* 0.88  --   --    ANIONGAP 11 7*  --   --    LABGLOM 50* >60  --   --    GFRAA >60 >60  --   --    CALCIUM 9.2 8.7  --   --    TROPHS  --   --       Recent Labs     20  0621 20  1105 20  1520 20  2111 20  0727 20  1122   POCGLU 153* 146* 139* 153* 138* 209*     ABG:No results found for: POCPH, PHART, PH, POCPCO2, FGG2JDX, PCO2, POCPO2, PO2ART, PO2, POCHCO3, OFL0ALC, HCO3, NBEA, PBEA, BEART, BE, THGBART, THB, GSL8EFA, VWZH4QTT, Q4USXLLV, O2SAT, FIO2  No results found for: SPECIAL  No results found for: CULTURE    Radiology:  Xr Femur Left (min 2 Views)    Result Date: 2020  Comminuted, displaced intertrochanteric left femoral fracture. Xr Hip 2-3 Vw W Pelvis Left    Result Date: 2020  Comminuted, displaced intertrochanteric left femoral fracture.        Physical Examination:        General appearance:  alert, cooperative and no distress at present  Mental Status:  oriented to person, place and time and normal affect  Lungs:  clear to auscultation bilaterally, normal effort  Heart:  regular rate and rhythm, + systolic murmur best heard in A1 area  Abdomen:  soft, nontender, nondistended, normal bowel sounds, no masses, hepatomegaly, splenomegaly  Extremities:  + External rotation of left lower extremity, no edema, redness, tenderness in the calves  Skin:  no gross lesions, rashes, induration    Assessment:        Hospital Problems           Last Modified POA    * (Principal) Closed displaced fracture of left femoral neck ([facility] [address] 12/25/2020 Yes    Diabetic neuropathy ([facility] [address] 12/25/2020 Yes    Essential hypertension 12/25/2020 Yes    Mixed hyperlipidemia 12/25/2020 Yes    Mild cognitive impairment 12/25/2020 Yes    Obsessive-compulsive disorder 12/25/2020 Yes    Moderate aortic stenosis 12/25/2020 Yes      Abnormal EKG    Plan:          1. 2D echo  2. Pain medicines as per orthopedics plan  3. Anticoagulation as per orthopedics plan  4. Discontinue IV fluids  5.  Insulin sliding scale for diabetes control, restart Metformin    Loraine Sotomayor MD  12/26/2020  3:57 PM

## 2020-12-26 NOTE — CARE COORDINATION
Social work: Referrals faxed to 1. Peterson Juan([facility]) 2. [facility]. Terry started. Update: phone call to [facility] to f/u on referral.  Writer informed no one in admissions office until Monday, VM left. Also await call back from admissions/[facility] to see if they can accept.

## 2020-12-26 NOTE — PROGRESS NOTES
Physical Therapy    Facility/Department: [facility]  Initial Assessment    NAME: Jeanette Smith  : 1935  MRN: [id number]    Date of Service: 2020    Discharge Recommendations:  Subacute/Skilled Nursing Facility       Procedure: Procedure(s) by Dr. Félix David :  Chelsea Moreland    RN reports patient is medically stable for therapy treatment this date. Chart reviewed prior to treatment and patient is agreeable for therapy. All lines intact and patient positioned comfortably at end of treatment. All patient needs addressed prior to ending therapy session. Assessment   Body structures, Functions, Activity limitations: Decreased functional mobility ; Decreased cognition;Decreased endurance;Decreased ROM; Decreased ADL status; Decreased strength;Decreased balance;Decreased safe awareness; Increased pain  Assessment: Pt will benefit from continued skilled PT to address balance, strength, activity tolerance, ROM, and safe mobility. Recommend SNF this date due to increased assist with all mobility and safety concerns if pt were to return home. Specific instructions for Next Treatment: advance mobility as tolerated  Prognosis: Good  Decision Making: Medium Complexity  Exam: ROM, strength, mobility, balance, activity tolerance, AM-PAC mobility  Clinical Presentation: evolving  PT Education: Goals;Transfer Training;Equipment;PT Role;Energy Conservation; Functional Mobility Training;Plan of Care;General Safety;Gait Training;Orientation;Precautions;Weight-bearing Education  REQUIRES PT FOLLOW UP: Yes  Activity Tolerance  Activity Tolerance: Patient limited by fatigue;Patient limited by pain; Patient limited by cognitive status  Activity Tolerance: Pt confused this date, requiring max cues and re-orientation. Increased pain in L hip with all mobility,       Patient Diagnosis(es): The encounter diagnosis was Closed fracture of left hip, initial encounter ([facility] [address]      has a past medical history of Diabetes mellitus Spoke with patient states that she needs a copy of the [employer] form. Printed the form an patient will pick it up tomorrow at the . ([address] Hyperlipidemia, and Hypertension. has a past surgical history that includes Cholecystectomy and Appendectomy. Restrictions  Restrictions/Precautions  Restrictions/Precautions: Weight Bearing, Fall Risk, Up as Tolerated  Lower Extremity Weight Bearing Restrictions  Left Lower Extremity Weight Bearing: Weight Bearing As Tolerated  Position Activity Restriction  Other position/activity restrictions: 2L O2 PRN, RUE IV, full WBAT on LLE, per RN ganesh moeller for pt to be up with assist.  Vision/Hearing  Vision: Impaired  Vision Exceptions: (blurriness in L eye)  Hearing: Within functional limits     Subjective  General  Chart Reviewed: Yes  Patient assessed for rehabilitation services?: Yes  Response To Previous Treatment: Not applicable  Family / Caregiver Present: No  Follows Commands: Within Functional Limits  Subjective  Subjective: Pt states limited pain when sitting in bed. Orientation  Orientation  Overall Orientation Status: Impaired  Orientation Level: Disoriented to place;Oriented to situation;Oriented to person;Disoriented to time(able to state she was in a hospital)  Social/Functional History  Social/Functional History  Lives With: Alone  Type of Home: Apartment(apartment on 1st floor)  Home Layout: One level  Home Access: Ramped entrance  Bathroom Shower/Tub: Tub/Shower unit  Bathroom Toilet: Standard  Bathroom Equipment: Tub transfer bench, Grab bars in [address]: Rolling walker, Cane(Pt will use RW when necessary)  Receives Help From: Family(Dtr usually does laundry)  ADL Assistance: Needs assistance(aide M,W,F assist with shower and stays 3hours. pt able to dress herself)  Homemaking Assistance: Needs assistance(aide assist with cleaning and laundry.  \"Mom's meals\" for food)  Homemaking Responsibilities: No  Ambulation Assistance: Independent  Transfer Assistance: Independent  Active : No(sister comes and drives pt to bank/store/appointments)  Occupation: Retired  Type of occupation: seamstress  Leisure & Hobbies: brenda  Additional Comments: Pt denies any falls besides the one that brought her into hospital.  Cognition   Cognition  Overall Cognitive Status: Exceptions  Arousal/Alertness: Appropriate responses to stimuli  Following Commands: Follows one step commands with repetition; Follows one step commands with increased time  Attention Span: Attends with cues to redirect  Memory: Decreased short term memory;Decreased recall of recent events  Safety Judgement: Decreased awareness of need for assistance;Decreased awareness of need for safety  Problem Solving: Assistance required to generate solutions;Assistance required to implement solutions;Assistance required to correct errors made;Assistance required to identify errors made;Decreased awareness of errors  Insights: Decreased awareness of deficits  Initiation: Requires cues for all  Sequencing: Requires cues for all    Objective     Observation/Palpation  Posture: Fair  Observation: O2 (pt not wearing it), RUE IV  Edema: slight in LLE  Scar: 2 incision area, both covered with dressing    AROM RLE (degrees)  RLE AROM: WFL  AROM LLE (degrees)  LLE General AROM: attempted LLE , although pt very resisted and guarded at knee. Unable to flex knee more than ~30degrees due to pain. Strength RLE  Comment: grossly 3+/5 MMT  Strength LLE  Comment: grossly 3/5  Strength RUE  Comment: see OT eval  Strength LUE  Comment: see OT eval  Tone RLE  RLE Tone: Normotonic  Tone LLE  LLE Tone: Normotonic  Motor Control  Gross Motor?: WNL  Sensation  Overall Sensation Status: WFL  Bed mobility  Supine to Sit: Moderate assistance;2 Person assistance  Sit to Supine: Unable to assess(Pt returned to sitting in chair)  Scooting: Moderate assistance;2 Person assistance  Comment: HOB elevated, tactile and verbal cues for hand placement with use of handrails. Pt required modAx2 for trunk and LLE.   Transfers  Sit to Stand: Maximum Assistance;2 Person Assistance  Stand to sit: Maximum Assistance;2 Person Assistance  Bed to Chair: 2 Person Assistance;Maximum assistance;Dependent/Total(use of frank stedy from toilet>chair)  Comment: Pt required max cues for proper hand placement with all transfers. Pt demo'd one toilet transfer with maxA x2 and cues for use of grab bars. Pt demo'd multiple sit to stand within frank stedy for brief change and cleaning. Christen Lanes stedy used for toilet transfer and to get pt from restroom to chair. Ambulation  Ambulation?: Yes  Ambulation 1  Surface: level tile  Device: Rolling Walker  Assistance: Maximum assistance;2 Person assistance  Gait Deviations: Slow Laura; Increased ROBERT; Decreased step length;Decreased step height  Distance: 15ft  Comments: Cues for sequencing with RW and stepping with LLE. After ambulating ~15ft, pt demo'd severe posterior lean to the left side and slight buckling in LLE. Pt then sat in chair and attempted to walk the rest of the way to restroom, but unsafe at this time as pt still demo'ing severe weakness and increased pain in LLE. Christen Lanes steady then used for all transfers and toilet>chair. Recommend staff use frank stedy for safety x2 assist.     Balance  Posture: Fair  Sitting - Static: Good  Sitting - Dynamic: Good  Standing - Static: Poor  Standing - Dynamic: Poor(BUE support from RW)  Exercises  Comments: Educated on all LE AROM with good carryover from pt. Plan   Plan  Times per week: 1-2x day / 5-6 days per week  Specific instructions for Next Treatment: advance mobility as tolerated  Current Treatment Recommendations: Strengthening, Transfer Training, Endurance Training, Cognitive Reorientation, ROM, Pain Management, Balance Training, Gait Training, Functional Mobility Training, Safety Education & Training, Positioning  Safety Devices  Type of devices:  All fall risk precautions in place, Call light within reach, Chair alarm in place, Gait belt, Nurse notified, Left in chair, Patient at risk for falls      OutComes Score  AM-PAC Score  AM-PAC Inpatient Mobility Raw Score : 11 (12/26/20 1229)  AM-PAC Inpatient T-Scale Score : 33.86 (12/26/20 1229)  Mobility Inpatient CMS 0-100% Score: 72.57 (12/26/20 1229)  Mobility Inpatient CMS G-Code Modifier : CL (12/26/20 1229)          Goals  Short term goals  Time Frame for Short term goals: 12 visits  Short term goal 1: Pt will perform all bed mobility with Esmer and use of handrails  Short term goal 2: Pt will perform all functional transfers with Esmer and correct hand placements on RW  Short term goal 3: Pt will ambulate 50ft with RW and SBA  Short term goal 4: Pt will tolerate 25mins of PT including therex, theract and gait training to improve functional mobility and activity tolerance. Patient Goals   Patient goals : To go home       Therapy Time   Individual Concurrent Group Co-treatment   Time In 0933 + 10min chart review         Time Out 1025         Minutes 62          tx time: 49mins    Co-treatment with OT warranted secondary to decreased safety and independence requiring 2 skilled therapy professionals to address individual discipline's goals. PT addressing pre gait trunk strengthening, weight shifting prior to transfers, transfer training and postural control in sitting.          Kayode Everett, PT

## 2020-12-26 NOTE — OP NOTE
[id number] [facility] [address]                [address] [place] [address]                                OPERATIVE REPORT    PATIENT NAME: Adalid Chappell                        :        1935  MED REC NO:   [id number]                             ROOM:         ACCOUNT NO:   [de-identified]                           ADMIT DATE: 2020  PROVIDER:     Marcos Lara    DATE OF PROCEDURE:  2020    PREOPERATIVE DIAGNOSIS:  Left intertrochanteric femur fracture. POSTOPERATIVE DIAGNOSIS:  Left intertrochanteric femur fracture. OPERATION PERFORMED:  Open treatment of intertrochanteric left femur fracture with placement of intramedullary implant (CPT: 08804). SURGEON:  Dakota Garsia MD    ANESTHESIA:  Marcos Lara DO, PGY-5    ANESTHESIA:  Spinal.    ESTIMATED BLOOD LOSS:  100 mL. COMPLICATIONS:  None. SPECIMENS:  None. IMPLANTS:  One Synthes 130 degree left 10 x 380 mm TFN, one Synthes 95  mm helical blade, and one Synthes 52 mm x 5.0 mm cortical screw. FINDINGS:  Comminuted left intertrochanteric femur fracture. INDICATIONS:  This is an 81-year-old female who initially presented to  [place] [address] on 2020 after she sustained a fall at a  [employer] party. She had immediate pain and inability to ambulate. Imaging performed at [facility] [address] ED demonstrated a comminuted  intertrochanteric femur fracture. This met operative indication in the  form of cephalomedullary nail to decrease the risk of early morbidity,  mortality. After discussing with the patient about surgical and  nonsurgical intervention, the patient has elected to undergo open  reduction internal fixation and placement of cephalomedullary nail, left  femur. Consent was obtained and placed in the chart. All questions  were answered appropriately.   Surgical risks including, but not limited to bleeding, blood clots, infection, damage to nearby tissues, vessels,  or nerves, wound-healing complications, failure of procedure, stiffness,  loss of motion, hardware failure, hardware irritation, anesthesia risk,  need for future surgery, loss of limb, loss of life were all discussed  with the patient. Knowing these risks, the patient wished to proceed  with surgery as indicated. OPERATIVE PROCEDURE:  The patient was taken to the operative suite and  spinal anesthetic was performed by the Anesthesia staff. The patient  was placed on the fracture table bed. All bony prominences were well  padded. The contralateral limb was lowered, so that the contralateral  limb was in a slight extension. We then used intraoperative fluoroscopy  to take our preoperative reduction films. The leg was then manipulated  appropriately to gain reduction. Being satisfied, the left lower  extremity was then prepped and draped in normal sterile fashion. At  this time, all team members paused to identify proper patient name,  indication, site, and allergies. All team members were in agreeance. Using marking pen, we mapped our incision about the proximal aspect of  the left femur in line with the greater trochanter. We incised the skin  and placed our guidewire on the tip of the greater trochanter. We then  checked the AP and lateral views. Being satisfied with placement of  guidewire, the guidewire was inserted into the femur. We then placed  our opening reamer and then placed a ball-tip guidewire, ensured that we  had adequate placement distally into the femur as well. We then  measured. We measured the adequate nail placement would be a 380 mm  nail. We then started at 8.5, sequentially increased sizes of the  reamer until we reached 11.5 mm reamer. We then determined a 10 mm nail  would be adequate.   So, at this time, a 380 mm x 10 mm Synthes TFN nail

## 2020-12-26 NOTE — CONSULTS
Transitions of Care Pharmacy Service   Medication Review    The patient's list of current home medications has been reviewed. Her refill history reflects compliance with medications. Source(s) of information: PCP med list ([facility] Every[facility]), Surescripts refill report ([facility])      Please feel free to call me with any questions about this encounter. Thank you.     Jessica Bueno [facility]   Transitions of Care Pharmacy Service  Phone:  [phone]  Fax: [phone]      Electronically signed by Jessica Bueno [facility] on 12/26/2020 at 10:25 AM           Medications Prior to Admission:   atorvastatin (LIPITOR) 20 MG tablet, Take 20 mg by mouth daily  metFORMIN (GLUCOPHAGE) 500 MG tablet, Take 500 mg by mouth 2 times daily (with meals)  lisinopril (PRINIVIL;ZESTRIL) 10 MG tablet, Take 10 mg by mouth daily

## 2020-12-27 LAB
EKG ATRIAL RATE: 58 BPM
EKG Q-T INTERVAL: 370 MS
EKG QRS DURATION: 78 MS
EKG QTC CALCULATION (BAZETT): 399 MS
EKG R AXIS: -19 DEGREES
EKG T AXIS: 140 DEGREES
EKG VENTRICULAR RATE: 70 BPM
ESTIMATED AVERAGE GLUCOSE: 123 MG/DL
GLUCOSE BLD-MCNC: 148 MG/DL (ref 65–105)
GLUCOSE BLD-MCNC: 154 MG/DL (ref 65–105)
GLUCOSE BLD-MCNC: 179 MG/DL (ref 65–105)
GLUCOSE BLD-MCNC: 209 MG/DL (ref 65–105)
HBA1C MFR BLD: 5.9 % (ref 4–6)
HCT VFR BLD CALC: 21.2 % (ref 36.3–47.1)
HCT VFR BLD CALC: 22.3 % (ref 36.3–47.1)
HCT VFR BLD CALC: 22.6 % (ref 36.3–47.1)
HEMOGLOBIN: 6.7 G/DL (ref 11.9–15.1)
HEMOGLOBIN: 7 G/DL (ref 11.9–15.1)
HEMOGLOBIN: 7.1 G/DL (ref 11.9–15.1)
MCH RBC QN AUTO: 31.7 PG (ref 25.2–33.5)
MCHC RBC AUTO-ENTMCNC: 31.4 G/DL (ref 28.4–34.8)
MCV RBC AUTO: 100.9 FL (ref 82.6–102.9)
NRBC AUTOMATED: 0 PER 100 WBC
PDW BLD-RTO: 13.6 % (ref 11.8–14.4)
PLATELET # BLD: 127 K/UL (ref 138–453)
PMV BLD AUTO: 9.3 FL (ref 8.1–13.5)
RBC # BLD: 2.24 M/UL (ref 3.95–5.11)
WBC # BLD: 3.8 K/UL (ref 3.5–11.3)

## 2020-12-27 PROCEDURE — 85018 HEMOGLOBIN: CPT

## 2020-12-27 PROCEDURE — 93010 ELECTROCARDIOGRAM REPORT: CPT | Performed by: INTERNAL MEDICINE

## 2020-12-27 PROCEDURE — 2580000003 HC RX 258: Performed by: STUDENT IN AN ORGANIZED HEALTH CARE EDUCATION/TRAINING PROGRAM

## 2020-12-27 PROCEDURE — 85014 HEMATOCRIT: CPT

## 2020-12-27 PROCEDURE — 6370000000 HC RX 637 (ALT 250 FOR IP): Performed by: INTERNAL MEDICINE

## 2020-12-27 PROCEDURE — 1200000000 HC SEMI PRIVATE

## 2020-12-27 PROCEDURE — 36415 COLL VENOUS BLD VENIPUNCTURE: CPT

## 2020-12-27 PROCEDURE — 85027 COMPLETE CBC AUTOMATED: CPT

## 2020-12-27 PROCEDURE — 6360000002 HC RX W HCPCS: Performed by: STUDENT IN AN ORGANIZED HEALTH CARE EDUCATION/TRAINING PROGRAM

## 2020-12-27 PROCEDURE — 6370000000 HC RX 637 (ALT 250 FOR IP): Performed by: ORTHOPAEDIC SURGERY

## 2020-12-27 PROCEDURE — 99232 SBSQ HOSP IP/OBS MODERATE 35: CPT | Performed by: INTERNAL MEDICINE

## 2020-12-27 PROCEDURE — 82947 ASSAY GLUCOSE BLOOD QUANT: CPT

## 2020-12-27 RX ORDER — 0.9 % SODIUM CHLORIDE 0.9 %
20 INTRAVENOUS SOLUTION INTRAVENOUS ONCE
Status: COMPLETED | OUTPATIENT
Start: 2020-12-27 | End: 2020-12-28

## 2020-12-27 RX ADMIN — ATORVASTATIN CALCIUM 20 MG: 20 TABLET, FILM COATED ORAL at 21:39

## 2020-12-27 RX ADMIN — INSULIN LISPRO 1 UNITS: 100 INJECTION, SOLUTION INTRAVENOUS; SUBCUTANEOUS at 21:39

## 2020-12-27 RX ADMIN — APIXABAN 2.5 MG: 2.5 TABLET, FILM COATED ORAL at 21:39

## 2020-12-27 RX ADMIN — METFORMIN HYDROCHLORIDE 500 MG: 500 TABLET ORAL at 08:45

## 2020-12-27 RX ADMIN — APIXABAN 2.5 MG: 2.5 TABLET, FILM COATED ORAL at 08:46

## 2020-12-27 RX ADMIN — Medication 10 ML: at 21:40

## 2020-12-27 RX ADMIN — INSULIN LISPRO 1 UNITS: 100 INJECTION, SOLUTION INTRAVENOUS; SUBCUTANEOUS at 12:16

## 2020-12-27 RX ADMIN — MORPHINE SULFATE 4 MG: 4 INJECTION, SOLUTION INTRAMUSCULAR; INTRAVENOUS at 13:18

## 2020-12-27 RX ADMIN — METFORMIN HYDROCHLORIDE 500 MG: 500 TABLET ORAL at 16:50

## 2020-12-27 RX ADMIN — MORPHINE SULFATE 4 MG: 4 INJECTION, SOLUTION INTRAMUSCULAR; INTRAVENOUS at 02:52

## 2020-12-27 RX ADMIN — MORPHINE SULFATE 2 MG: 2 INJECTION, SOLUTION INTRAMUSCULAR; INTRAVENOUS at 18:40

## 2020-12-27 RX ADMIN — INSULIN LISPRO 1 UNITS: 100 INJECTION, SOLUTION INTRAVENOUS; SUBCUTANEOUS at 16:50

## 2020-12-27 RX ADMIN — Medication 10 ML: at 08:46

## 2020-12-27 RX ADMIN — INSULIN LISPRO 1 UNITS: 100 INJECTION, SOLUTION INTRAVENOUS; SUBCUTANEOUS at 08:45

## 2020-12-27 RX ADMIN — MORPHINE SULFATE 4 MG: 4 INJECTION, SOLUTION INTRAMUSCULAR; INTRAVENOUS at 08:51

## 2020-12-27 RX ADMIN — MORPHINE SULFATE 2 MG: 2 INJECTION, SOLUTION INTRAMUSCULAR; INTRAVENOUS at 21:43

## 2020-12-27 ASSESSMENT — PAIN SCALES - GENERAL
PAINLEVEL_OUTOF10: 6
PAINLEVEL_OUTOF10: 8
PAINLEVEL_OUTOF10: 10
PAINLEVEL_OUTOF10: 2
PAINLEVEL_OUTOF10: 3

## 2020-12-27 ASSESSMENT — PAIN DESCRIPTION - PROGRESSION
CLINICAL_PROGRESSION: GRADUALLY WORSENING
CLINICAL_PROGRESSION: GRADUALLY WORSENING

## 2020-12-27 ASSESSMENT — PAIN DESCRIPTION - LOCATION
LOCATION: HIP
LOCATION: HIP

## 2020-12-27 ASSESSMENT — PAIN DESCRIPTION - DESCRIPTORS: DESCRIPTORS: ACHING;DISCOMFORT

## 2020-12-27 ASSESSMENT — PAIN DESCRIPTION - ONSET
ONSET: ON-GOING
ONSET: ON-GOING

## 2020-12-27 ASSESSMENT — PAIN - FUNCTIONAL ASSESSMENT
PAIN_FUNCTIONAL_ASSESSMENT: PREVENTS OR INTERFERES SOME ACTIVE ACTIVITIES AND ADLS

## 2020-12-27 ASSESSMENT — PAIN DESCRIPTION - FREQUENCY
FREQUENCY: INTERMITTENT

## 2020-12-27 ASSESSMENT — PAIN DESCRIPTION - PAIN TYPE: TYPE: SURGICAL PAIN

## 2020-12-27 ASSESSMENT — PAIN DESCRIPTION - ORIENTATION
ORIENTATION: LEFT
ORIENTATION: LEFT

## 2020-12-27 NOTE — PLAN OF CARE
Problem: Pain:  Goal: Pain level will decrease  Description: Pain level will decrease  12/27/2020 0946 by Antoine Walton RN  Outcome: Ongoing  Note: Pt medicated with pain medication prn. Assessed all pain characteristics including level, type, location, frequency, and onset. Non-pharmacologic interventions offered to pt as well. Pt states pain is tolerable at this time. Will continue to monitor      Problem: Falls - Risk of:  Goal: Will remain free from falls  Description: Will remain free from falls  12/27/2020 0946 by Antoine Walton RN  Outcome: Ongoing  Note: Patient is a fall risk during this admission. Fall risk assessment was performed. Patient is absent of falls. Bed is in the lowest position. Wheels on the bed are locked. Call light and bed side table are within reach. Clutter is removed. Patient was educated to call out when needing assistance or wanting to get out of bed. Patient offered toileting assistance during rounding. Hourly rounds have been performed.

## 2020-12-27 NOTE — FLOWSHEET NOTE
Patient was sleeping. TV, lights were off.  shared in presence, prayers.    leaves prayer card for possible follow up.     12/27/20 7591   Encounter Summary   Services provided to: Patient   Referral/Consult From: Scott   Continue Visiting   (12-27-20 PT: sleeping)   Complexity of Encounter Low   Length of Encounter 15 minutes   Routine   Type Initial   Assessment Sleeping   Intervention Prayer

## 2020-12-27 NOTE — PLAN OF CARE
Problem: Pain:  Goal: Pain level will decrease  Description: Pain level will decrease  Outcome: Ongoing  Goal: Control of acute pain  Description: Control of acute pain  Outcome: Ongoing     Problem: Skin Integrity:  Goal: Will show no infection signs and symptoms  Description: Will show no infection signs and symptoms  Outcome: Ongoing  Goal: Absence of new skin breakdown  Description: Absence of new skin breakdown  Outcome: Ongoing     Problem: Falls - Risk of:  Goal: Will remain free from falls  Description: Will remain free from falls  Outcome: Ongoing  Goal: Absence of physical injury  Description: Absence of physical injury  Outcome: Ongoing     Problem: Infection - Surgical Site:  Goal: Will show no infection signs and symptoms  Description: Will show no infection signs and symptoms  Outcome: Ongoing

## 2020-12-27 NOTE — PROGRESS NOTES
[facility]  Office: [phone]  Radha Seo, DO, Crista Rivas, DO, Jossue Veliz, DO, Hilda Aquino Blood, DO, Shirley Noe MD, Loly Araujo MD, Carmen Paiz MD, Juan C Benites MD, Inga Morfin MD, Fabiola Carl MD, Viviana Porras MD, Gypsy Nunes MD, Juliana Pratt MD, Ellyn Quezada, DO, Ubaldo De Los Santos MD, Marielena Herrera MD, Maximus Jerome DO, Rizwan Velez MD,  Rigo Soriano, DO, Gregory Crenshaw MD, Prashant Souza MD, Paul Gómez, CNP, [facility], CNP, Juventino Brewster, CNP, Crystal Chung, CNS, Agustin Lobo, CNP, Jose Eduardo Segal, CNP, Judy Valerio, CNP, Adrienne Truong, CNP, Atif Pulido, CNP, ANITRA LucasC, Essie Guzman, DNP, Jackie Self, CNP, Bonita Lopez, CNP, Patrick Rockwell, CNP, Viri Ryan, CNP, Rahul Quezada, CNP         [facility]   [address]    Progress Note    12/27/2020    2:31 PM    Name:   Vincenzo Baker  MRN:     [id number]     [place]:      [de-identified]   Room:   2024/202401   Day:  3  Admit Date:  12/24/2020  8:57 PM    PCP:   Alhaji Francisco MD  Code Status:  Full Code    Subjective:     C/C:   Chief Complaint   Patient presents with   Sherl Loge Leg Pain     fell this evening     Interval History Status:   Patient had left hip surgery 12/26/2020  Doing well, pain is well controlled  Blood glucose 148, A1c 5.9  Hemoglobin 7.1  Echo shows moderate aortic stenosis only  EKG was showing junctional rhythm  Complaining of a mild swelling of dorsum of hands at the IV sites  Brief History:   Vincenzo Baker is a 81 y.o. Non-/non  female who presents with Leg Pain (fell this evening)   and is admitted to the hospital for the management of Closed displaced fracture of left femoral neck ([address]    Patient presented to the emergency department after a fall with a chief complaint of left hip and leg pain. She states she was going down the steps when her shoe slipped and she fell.   She did not hit her head or lose consciousness. The fall happened around 7 PM.  She denies any other injuries. She has a history of hypertension, hyperlipidemia, OCD, diabetic neuropathy, mild cognitive impairment. X-ray of the hip demonstrates a comminuted displaced intertrochanteric left femoral fracture. Orthopedic surgery was consulted by the emergency department. She is being admitted for further management of closed displaced fracture of the left femoral neck. Review of Systems:     Constitutional:  negative for chills, fevers, sweats  Respiratory:  negative for cough, dyspnea on exertion, shortness of breath, wheezing  Cardiovascular:  negative for chest pain, chest pressure/discomfort, lower extremity edema, palpitations  Gastrointestinal:  negative for abdominal pain, constipation, diarrhea, nausea, vomiting  Neurological:  negative for dizziness, headache    Medications: Allergies: Allergies   Allergen Reactions    Aleve [Naproxen] Swelling       Current Meds:   Scheduled Meds:    insulin lispro  0-6 Units Subcutaneous TID WC    insulin lispro  0-3 Units Subcutaneous Nightly    apixaban  2.5 mg Oral BID    metFORMIN  500 mg Oral BID WC    atorvastatin  20 mg Oral Daily    sodium chloride flush  10 mL Intravenous 2 times per day    diphenhydrAMINE  25 mg Intravenous Once     Continuous Infusions:    dextrose       PRN Meds: glucose, dextrose, glucagon (rDNA), dextrose, sodium chloride flush, potassium chloride **OR** potassium alternative oral replacement **OR** potassium chloride, magnesium sulfate, promethazine **OR** ondansetron, polyethylene glycol, nicotine, acetaminophen **OR** acetaminophen, morphine **OR** morphine    Data:     Past Medical History:   has a past medical history of Diabetes mellitus ([address] Hyperlipidemia, and Hypertension. Social History:   reports that she has never smoked. She has never used smokeless tobacco. She reports that she does not drink alcohol or use drugs.      Family History: History reviewed. No pertinent family history. Vitals:  BP (!) 133/55   Pulse 82   Temp 97.8 °F (36.6 °C) (Oral)   Resp 18   Ht 5' 5\" (1.651 m)   Wt 204 lb 3.2 oz (92.6 kg)   SpO2 95%   BMI 33.98 kg/m²   Temp (24hrs), Av.8 °F (36.6 °C), Min:97.7 °F (36.5 °C), Max:98.2 °F (36.8 °C)    Recent Labs     20  1601 208 20  0644 20  1151   POCGLU 168* 136* 148* 154*       I/O (24Hr): Intake/Output Summary (Last 24 hours) at 2020 1431  Last data filed at 2020 1932  Gross per 24 hour   Intake 130 ml   Output 100 ml   Net 30 ml       Labs:  Hematology:  Recent Labs     2059 20  0858 20  0927   WBC  --  3.8 3.4* 3.8   RBC  --  2.89* 2.40* 2.24*   HGB  --  9.2* 7.5* 7.1*   HCT  --  28.2* 24.2* 22.6*   MCV  --  97.6 100.8 100.9   MCH  --  31.8 31.3 31.7   MCHC  --  32.6 31.0 31.4   RDW  --  13.3 13.6 13.6   PLT  --  163 145 127*   MPV  --  9.0 9.5 9.3   INR 0.9 1.0  --   --      Chemistry:  Recent Labs     2059 20  1008 20  1947    138  --   --    K 4.7 4.8  --   --     106  --   --    CO2 23 25  --   --    GLUCOSE 192* 176*  --   --    BUN 27* 24*  --   --    CREATININE 1.05* 0.88  --   --    ANIONGAP 11 7*  --   --    LABGLOM 50* >60  --   --    GFRAA >60 >60  --   --    CALCIUM 9.2 8.7  --   --    TROPHS  --   --  12 12     Recent Labs     20  0659 20  0659 20  0727 20  1122 20  1601 20  2058 20  0644 20  1151   LABA1C 5.9  --   --   --   --   --   --   --    POCGLU  --    < > 138* 209* 168* 136* 148* 154*    < > = values in this interval not displayed.      ABG:No results found for: POCPH, PHART, PH, POCPCO2, AEC0VHA, PCO2, POCPO2, PO2ART, PO2, POCHCO3, EOY7RVK, HCO3, NBEA, PBEA, BEART, BE, THGBART, THB, DNO4TDS, NEVW2OFB, X1IJRBHR, O2SAT, FIO2  No results found for: SPECIAL  No results found for: CULTURE    Radiology:  Xr Femur

## 2020-12-27 NOTE — PROGRESS NOTES
Medications    Current Facility-Administered Medications: insulin lispro (HUMALOG) injection vial 0-6 Units, 0-6 Units, Subcutaneous, TID WC  insulin lispro (HUMALOG) injection vial 0-3 Units, 0-3 Units, Subcutaneous, Nightly  glucose (GLUTOSE) 40 % oral gel 15 g, 15 g, Oral, PRN  dextrose 50 % IV solution, 12.5 g, Intravenous, PRN  glucagon (rDNA) injection 1 mg, 1 mg, Intramuscular, PRN  dextrose 5 % solution, 100 mL/hr, Intravenous, PRN  apixaban (ELIQUIS) tablet 2.5 mg, 2.5 mg, Oral, BID  metFORMIN (GLUCOPHAGE) tablet 500 mg, 500 mg, Oral, BID WC  atorvastatin (LIPITOR) tablet 20 mg, 20 mg, Oral, Daily  sodium chloride flush 0.9 % injection 10 mL, 10 mL, Intravenous, 2 times per day  sodium chloride flush 0.9 % injection 10 mL, 10 mL, Intravenous, PRN  potassium chloride (KLOR-CON M) extended release tablet 40 mEq, 40 mEq, Oral, PRN **OR** potassium bicarb-citric acid (EFFER-K) effervescent tablet 40 mEq, 40 mEq, Oral, PRN **OR** potassium chloride 10 mEq/100 mL IVPB (Peripheral Line), 10 mEq, Intravenous, PRN  magnesium sulfate 1 g in dextrose 5% 100 mL IVPB, 1 g, Intravenous, PRN  promethazine (PHENERGAN) tablet 12.5 mg, 12.5 mg, Oral, Q6H PRN **OR** ondansetron (ZOFRAN) injection 4 mg, 4 mg, Intravenous, Q6H PRN  polyethylene glycol (GLYCOLAX) packet 17 g, 17 g, Oral, Daily PRN  nicotine (NICODERM CQ) 21 MG/24HR 1 patch, 1 patch, Transdermal, Daily PRN  acetaminophen (TYLENOL) tablet 650 mg, 650 mg, Oral, Q6H PRN **OR** acetaminophen (TYLENOL) suppository 650 mg, 650 mg, Rectal, Q6H PRN  diphenhydrAMINE (BENADRYL) injection 25 mg, 25 mg, Intravenous, Once  morphine (PF) injection 2 mg, 2 mg, Intravenous, Q3H PRN **OR** morphine sulfate (PF) injection 4 mg, 4 mg, Intravenous, Q3H PRN        PLAN    The primary team has already stopped her fluids. I recommend she elevate her hand slightly to help with the dependent swelling.   She should be up with physical therapy weightbearing as tolerated with

## 2020-12-28 LAB
GLUCOSE BLD-MCNC: 151 MG/DL (ref 65–105)
GLUCOSE BLD-MCNC: 185 MG/DL (ref 65–105)
GLUCOSE BLD-MCNC: 198 MG/DL (ref 65–105)
GLUCOSE BLD-MCNC: 245 MG/DL (ref 65–105)
HCT VFR BLD CALC: 24.2 % (ref 36.3–47.1)
HCT VFR BLD CALC: 26.6 % (ref 36.3–47.1)
HEMOGLOBIN: 7.9 G/DL (ref 11.9–15.1)
HEMOGLOBIN: 8.5 G/DL (ref 11.9–15.1)

## 2020-12-28 PROCEDURE — 85014 HEMATOCRIT: CPT

## 2020-12-28 PROCEDURE — P9016 RBC LEUKOCYTES REDUCED: HCPCS

## 2020-12-28 PROCEDURE — 97116 GAIT TRAINING THERAPY: CPT

## 2020-12-28 PROCEDURE — 1200000000 HC SEMI PRIVATE

## 2020-12-28 PROCEDURE — 99232 SBSQ HOSP IP/OBS MODERATE 35: CPT | Performed by: INTERNAL MEDICINE

## 2020-12-28 PROCEDURE — 2580000003 HC RX 258: Performed by: NURSE PRACTITIONER

## 2020-12-28 PROCEDURE — 97535 SELF CARE MNGMENT TRAINING: CPT

## 2020-12-28 PROCEDURE — 82947 ASSAY GLUCOSE BLOOD QUANT: CPT

## 2020-12-28 PROCEDURE — 97110 THERAPEUTIC EXERCISES: CPT

## 2020-12-28 PROCEDURE — 2580000003 HC RX 258: Performed by: STUDENT IN AN ORGANIZED HEALTH CARE EDUCATION/TRAINING PROGRAM

## 2020-12-28 PROCEDURE — 6370000000 HC RX 637 (ALT 250 FOR IP): Performed by: INTERNAL MEDICINE

## 2020-12-28 PROCEDURE — 85018 HEMOGLOBIN: CPT

## 2020-12-28 PROCEDURE — 36415 COLL VENOUS BLD VENIPUNCTURE: CPT

## 2020-12-28 PROCEDURE — 36430 TRANSFUSION BLD/BLD COMPNT: CPT

## 2020-12-28 PROCEDURE — 86900 BLOOD TYPING SEROLOGIC ABO: CPT

## 2020-12-28 PROCEDURE — 6370000000 HC RX 637 (ALT 250 FOR IP): Performed by: ORTHOPAEDIC SURGERY

## 2020-12-28 RX ORDER — OXYCODONE HYDROCHLORIDE AND ACETAMINOPHEN 5; 325 MG/1; MG/1
2 TABLET ORAL EVERY 6 HOURS PRN
Status: DISCONTINUED | OUTPATIENT
Start: 2020-12-28 | End: 2020-12-30 | Stop reason: HOSPADM

## 2020-12-28 RX ORDER — OXYCODONE HYDROCHLORIDE AND ACETAMINOPHEN 5; 325 MG/1; MG/1
1 TABLET ORAL EVERY 6 HOURS PRN
Status: DISCONTINUED | OUTPATIENT
Start: 2020-12-28 | End: 2020-12-30 | Stop reason: HOSPADM

## 2020-12-28 RX ADMIN — INSULIN LISPRO 2 UNITS: 100 INJECTION, SOLUTION INTRAVENOUS; SUBCUTANEOUS at 14:01

## 2020-12-28 RX ADMIN — APIXABAN 2.5 MG: 2.5 TABLET, FILM COATED ORAL at 10:49

## 2020-12-28 RX ADMIN — Medication 10 ML: at 22:18

## 2020-12-28 RX ADMIN — ATORVASTATIN CALCIUM 20 MG: 20 TABLET, FILM COATED ORAL at 21:19

## 2020-12-28 RX ADMIN — OXYCODONE AND ACETAMINOPHEN 1 TABLET: 5; 325 TABLET ORAL at 14:40

## 2020-12-28 RX ADMIN — INSULIN LISPRO 1 UNITS: 100 INJECTION, SOLUTION INTRAVENOUS; SUBCUTANEOUS at 10:49

## 2020-12-28 RX ADMIN — INSULIN LISPRO 1 UNITS: 100 INJECTION, SOLUTION INTRAVENOUS; SUBCUTANEOUS at 22:14

## 2020-12-28 RX ADMIN — METFORMIN HYDROCHLORIDE 500 MG: 500 TABLET ORAL at 10:49

## 2020-12-28 RX ADMIN — SODIUM CHLORIDE 20 ML: 0.9 INJECTION, SOLUTION INTRAVENOUS at 00:15

## 2020-12-28 RX ADMIN — Medication 10 ML: at 10:50

## 2020-12-28 RX ADMIN — APIXABAN 2.5 MG: 2.5 TABLET, FILM COATED ORAL at 21:19

## 2020-12-28 RX ADMIN — METFORMIN HYDROCHLORIDE 500 MG: 500 TABLET ORAL at 22:14

## 2020-12-28 RX ADMIN — OXYCODONE AND ACETAMINOPHEN 1 TABLET: 5; 325 TABLET ORAL at 21:19

## 2020-12-28 ASSESSMENT — PAIN DESCRIPTION - ONSET
ONSET: ON-GOING
ONSET: GRADUAL

## 2020-12-28 ASSESSMENT — PAIN DESCRIPTION - LOCATION
LOCATION: HIP
LOCATION: HIP

## 2020-12-28 ASSESSMENT — PAIN DESCRIPTION - PAIN TYPE: TYPE: SURGICAL PAIN

## 2020-12-28 ASSESSMENT — PAIN DESCRIPTION - DESCRIPTORS: DESCRIPTORS: ACHING;DISCOMFORT

## 2020-12-28 ASSESSMENT — PAIN DESCRIPTION - PROGRESSION
CLINICAL_PROGRESSION: GRADUALLY WORSENING

## 2020-12-28 ASSESSMENT — PAIN DESCRIPTION - FREQUENCY
FREQUENCY: INTERMITTENT
FREQUENCY: INTERMITTENT

## 2020-12-28 ASSESSMENT — PAIN SCALES - GENERAL
PAINLEVEL_OUTOF10: 6
PAINLEVEL_OUTOF10: 6

## 2020-12-28 ASSESSMENT — PAIN - FUNCTIONAL ASSESSMENT: PAIN_FUNCTIONAL_ASSESSMENT: PREVENTS OR INTERFERES SOME ACTIVE ACTIVITIES AND ADLS

## 2020-12-28 NOTE — PROGRESS NOTES
Physical Therapy  Facility/Department: [facility]  Daily Treatment Note  NAME: Hi Delgado  : 1935  MRN: [id number]    Date of Service: 2020    Discharge Recommendations:  Subacute/Skilled Nursing Facility   Assessment   Body structures, Functions, Activity limitations: Decreased functional mobility ; Decreased cognition;Decreased endurance;Decreased ROM; Decreased ADL status; Decreased strength;Decreased balance;Decreased safe awareness; Increased pain  Assessment: Pt will benefit from continued skilled PT to address balance, strength, activity tolerance, ROM, and safe mobility. Recommend SNF this date due to increased assist with all mobility and safety concerns if pt were to return home. Prognosis: Good  Decision Making: Medium Complexity  Clinical Presentation: evolving  REQUIRES PT FOLLOW UP: Yes  Activity Tolerance  Activity Tolerance: Patient limited by pain; Patient limited by endurance; Patient limited by fatigue     Patient Diagnosis(es): The encounter diagnosis was Closed fracture of left hip, initial encounter ([facility] [address] has a past medical history of Diabetes mellitus ([facility] [address] [facility] Hyperlipidemia, and Hypertension. has a past surgical history that includes Cholecystectomy; Appendectomy; and Hip fracture surgery (Left, 2020).     Restrictions  Restrictions/Precautions  Restrictions/Precautions: Weight Bearing, Fall Risk, Up as Tolerated  Required Braces or Orthoses?: No  Lower Extremity Weight Bearing Restrictions  Left Lower Extremity Weight Bearing: Weight Bearing As Tolerated  Position Activity Restriction  Other position/activity restrictions: RUE IV, full WBAT on LLE, 2 assist, telemetry  Subjective   General  Chart Reviewed: Yes  Family / Caregiver Present: Yes  Subjective  Subjective: Pt agreeable to PT session  Pain Screening  Patient Currently in Pain: Yes  Vital Signs  Patient Currently in Pain: Yes       Orientation  Orientation  Overall Orientation Status: Within Functional Limits Objective   Bed mobility  Rolling: Maximal assistance;2 Person assistance  Sit to supine: Maximal assistance;2 Person assistance  Scooting: Maximal assistance;2 Person assistance  Transfers  Sit to Stand: Moderate Assistance;Maximum Assistance;2 Person Assistance  Stand to sit: Moderate Assistance;2 Person Assistance  Bed to Chair: Moderate assistance;2 Person Assistance  Stand Pivot Transfers: Moderate Assistance;2 Person Assistance  Lateral Transfers: Moderate Assistance;2 Person Assistance  Comment: Verbal cues for hand placement with transfers  Ambulation  Ambulation?: Yes  More Ambulation?: Yes  Ambulation 1  Surface: level tile  Device: Rolling Walker  Assistance: Moderate assistance;2 Person assistance  Quality of Gait: unsteady gait, slow and guarded  Gait Deviations: Slow Laura;Decreased step length;Decreased step height  Distance: 5ft x 2  Comments: Verbal cues for sequencing with RW  Ambulation 2 ( PM Session)  Surface - 2: level tile  Device 2: Rolling Walker  Assistance 2: Moderate assistance;2 Person assistance  Quality of Gait 2: unsteady gait, slow and guarded  Gait Deviations: Slow Laura;Decreased step length;Decreased step height  Distance: 5ft  Comments: back to bed     Balance  Posture: Fair  Sitting - Static: Good  Sitting - Dynamic: Good  Standing - Static: Fair  Standing - Dynamic: Fair;-  Exercises  Knee Long Arc Quad: 10  Ankle Pumps: 20  Comments: Education on safety with all functional mobility      Goals  Short term goals  Time Frame for Short term goals: 12 visits  Short term goal 1: Pt will perform all bed mobility with Esmer and use of handrails  Short term goal 2: Pt will perform all functional transfers with Esmer and correct hand placements on RW  Short term goal 3: Pt will ambulate 50ft with RW and SBA  Short term goal 4: Pt will tolerate 25mins of PT including therex, theract and gait training to improve functional mobility and activity tolerance.   Patient Goals   Patient goals : To go home    Plan    Plan  Times per week: 1-2x day / 5-6 days per week  Specific instructions for Next Treatment: advance mobility as tolerated  Current Treatment Recommendations: Strengthening, Transfer Training, Endurance Training, Cognitive Reorientation, ROM, Pain Management, Balance Training, Gait Training, Functional Mobility Training, Safety Education & Training, Positioning  Safety Devices  Type of devices: Nurse notified, Left in bed, Gait belt, Call light within reach, Bed alarm in place, All fall risk precautions in place     Therapy Time   Individual Concurrent Group Co-treatment   Time In        1023/1307   Time Out        1046/1315   Minutes        32        Co-treatment with OT warranted secondary to decreased safety and independence requiring 2 skilled therapy professionals to address individual discipline's goals. PT addressing \"pre gait trunk strengthening\",\"weight shifting prior to transfers\",\"transfer training\",\"postural control in sitting\".   Benji Curtis, PTA

## 2020-12-28 NOTE — PROGRESS NOTES
Pt was observed for the first 15 minutes of blood transfusion and no suspected reaction was observed.

## 2020-12-28 NOTE — CARE COORDINATION
Social work; faxed referral to [address] [place] [address]. Will fax to [facility] also. Pt will require a precert. Will need rocio and Rx at discharge. Will start hens. Sima freedman    Social work: spoke to [address] [place] [address] and they will start precert. No need to fax to 2nd choice.   Sima freedman

## 2020-12-28 NOTE — PROGRESS NOTES
Occupational Therapy  Facility/Department: [facility]  Daily Treatment Note  NAME: Evelyn Urena  : 1935  MRN: [id number]    Date of Service: 2020    Discharge Recommendations:  Subacute/Skilled Nursing Facility       Assessment   Performance deficits / Impairments: Decreased functional mobility ; Decreased safe awareness;Decreased ADL status; Decreased cognition;Decreased high-level IADLs;Decreased endurance;Decreased strength  Assessment: Pt would benefit from skilled OT while IP with continued therapy in SNF at d/c to return to prior level of indp before returning home  Prognosis: Good  REQUIRES OT FOLLOW UP: Yes  Activity Tolerance  Activity Tolerance: Patient Tolerated treatment well  Activity Tolerance: P+  Safety Devices  Safety Devices in place: Yes  Type of devices: All fall risk precautions in place; Left in bed;Bed alarm in place;Call light within reach;Nurse notified;Gait belt;Patient at risk for falls         Patient Diagnosis(es): The encounter diagnosis was Closed fracture of left hip, initial encounter ([facility] [address] has a past medical history of Diabetes mellitus ([facility] [address] Hyperlipidemia, and Hypertension. has a past surgical history that includes Cholecystectomy; Appendectomy; and Hip fracture surgery (Left, 2020). Restrictions  Restrictions/Precautions  Restrictions/Precautions: Weight Bearing, Fall Risk, Up as Tolerated  Required Braces or Orthoses?: No  Lower Extremity Weight Bearing Restrictions  Left Lower Extremity Weight Bearing: Weight Bearing As Tolerated  Position Activity Restriction  Other position/activity restrictions: RUE IV, full WBAT on LLE, 2 assist, telemetry  Subjective   General  Chart Reviewed: Yes  Patient assessed for rehabilitation services?: Yes  Response to previous treatment: Patient with no complaints from previous session  Family / Caregiver Present: Yes(daughter)  Subjective  Subjective: Pt is pleasant and agreeable for therapy.   Pt demo's some confusion throughout eval.      Orientation  Orientation  Overall Orientation Status: Within Functional Limits  Objective             Balance  Sitting Balance: Contact guard assistance  Standing Balance: Moderate assistance(x2)  Standing Balance  Time: 60-90 seconds  Activity: mobility  Functional Mobility  Functional - Mobility Device: Rolling Walker  Activity: Other  Assist Level: Moderate assistance(x2)  Functional Mobility Comments: Pt completed functional mobility a few steps forward away from chair using RW with MOD A x2. Bed mobility  Sit to Supine: Maximum assistance;2 Person assistance  Comment: Verbal cues for technique  Transfers  Stand Step Transfers: Moderate assistance;2 Person assistance  Sit to stand: 2 Person assistance;Maximum assistance; Moderate assistance  Stand to sit: 2 Person assistance; Moderate assistance;Maximum assistance  Transfer Comments: Max verbal/tactile cues for hand placement and overall safety/sequenicng. Cognition  Overall Cognitive Status: Exceptions  Arousal/Alertness: Appropriate responses to stimuli  Following Commands: Follows one step commands with repetition; Follows one step commands with increased time  Attention Span: Appears intact; Attends with cues to redirect  Memory: Decreased short term memory;Decreased recall of recent events  Safety Judgement: Decreased awareness of need for assistance;Decreased awareness of need for safety  Problem Solving: Assistance required to generate solutions;Assistance required to implement solutions;Assistance required to correct errors made;Assistance required to identify errors made;Decreased awareness of errors  Insights: Decreased awareness of deficits  Initiation: Requires cues for all  Sequencing: Requires cues for all  Cognition Comment: Pt pleasant and cooperative.      Perception  Overall Perceptual Status: [facility]                                   Plan   Plan  Times per week: 5-6x/week  Times per day: Daily  Specific instructions for Next Treatment: Cog screen  Current Treatment Recommendations: Strengthening, Endurance Training, Patient/Caregiver Education & Training, Self-Care / ADL, Equipment Evaluation, Education, & procurement, Functional Mobility Training             Goals  Short term goals  Time Frame for Short term goals: By d/c, pt will  Short term goal 1: demo Min A for all bed mob with use of bedrails/controls as needed  Short term goal 2: demo Mod A for ADL transfers with good technique  Short term goal 3: demo Mod A functional mob at room distances with good safety/pacing and approp use of DME  Short term goal 4: demo Min A for UB ADLs and Mod A for LB ADLs with use of AD/DME as needed  Short term goal 5: demo and verb good understanding of all educ provided on fall prevention, EC/WS, and possible equip needs for the home  Long term goals  Long term goal 1: demo indp with UB HEP to increase strength for improved participation in self-care and improved comfort with RW use  Patient Goals   Patient goals : Pt would like to recover in rehab before returning home       Therapy Time   Individual Concurrent Co-treatment Co-treatment   Time In     [id number]   Time Out     [phone]   Minutes     23 8        Co-treatment with PT warranted secondary to decreased safety and independence requiring 2 skilled therapy professionals to address individual discipline's goals. OT addressing \"preparation for ADL transfer\",\"sitting balance for increased ADL performance\",\"sitting/activity tolerance\",\"functional reaching\",\"environmental safety/scanning\",\"fall prevention\",\"functional mobility for ADL transfers\",\"ability to sequence and follow directions\",\"functional UE strength\".       XIMENA Munoz

## 2020-12-28 NOTE — CARE COORDINATION
DC Planning    Spoke with pt. dtr Mckayyoko Cernaver, updated the referrals have been sent to 1. [facility] ([address] at [facility]) 2. [facility]. Explained we will not have an answer on acceptance to either facility till Monday. Dtr Giovany Elder is from [facility] and will be going back tomorrow night or Tuesday. She had various questions about the facilities including visitor policies, what to bring for pt (clothes, bath products), and if pt.  will be able to visit. Pt.  lives locally and will be the visiting the most.     Explained each facility is different and policies vary. She will need an update on Monday when facilities get back with us on an answer and plans to be here Monday morning.

## 2020-12-28 NOTE — PROGRESS NOTES
[facility]  Office: [phone]  Benjamin Charles, DO, Jose Miguel Radford, DO, David Gaston, DO, Selene Fitzpatrick Blood, DO, Yesenia Ash MD, Nemo Alexander MD, Lise Hanson MD, Elliot Maguire MD, Camilla Amaral MD, Myles Hernandez MD, Aide Neal MD, Maria Luisa Rogers MD, Juliana Reyes MD, Jazmyn Nava DO, Andrew Kim MD, Bela Wagner MD, Sussy Hedrick DO, Camilo Francois MD,  Kyle Rausch, DO, Heber Brand MD, Magdy Barrera MD, Donald White [facility], [facility], CNP, Anitra Mendieta, CNP, Cayla Mckeon, CNS, Angelia Rubio, CNP, Grace Zelaya, CNP, Brandon Piedra, CNP, Jamie Burch, CNP, Joel Corral, CNP, Stephanie Philippe PA-C, Sandra Zarate, DNP, Tyrese Andrews, CNP, Elidia Marc, CNP, Aurea Curry, CNP, Ivette Curiel, CNP, Oralia Velasquez, CNP         [address]    Progress Note    12/28/2020    1:04 PM    Name:   Trinity Becerra  MRN:     [id number]     [place]:      [de-identified]   Room:   2024/202401   Day:  4  Admit Date:  12/24/2020  8:57 PM    PCP:   Sharmaine Ramírez MD  Code Status:  Full Code    Subjective:     C/C:   Chief Complaint   Patient presents with   Doreen Officer Leg Pain     fell this evening     Interval History Status:   Patient had left hip surgery 12/26/2020  Doing well, pain is well controlled  Blood glucose 151, A1c 5.9  Hemoglobin 7.9-8.5, had 1 unit of PRBC transfused yesterday  Echo shows moderate aortic stenosis only  EKG was showing junctional rhythm  Complaining of a mild swelling of dorsum of hands at the IV sites  Brief History:   Trinity Becerra is a 81 y.o. Non-/non  female who presents with Leg Pain (fell this evening)   and is admitted to the hospital for the management of Closed displaced fracture of left femoral neck ([address]    Patient presented to the emergency department after a fall with a chief complaint of left hip and leg pain. She states she was going down the steps when her shoe slipped and she fell. She did not hit her head or lose consciousness. The fall happened around 7 PM.  She denies any other injuries. She has a history of hypertension, hyperlipidemia, OCD, diabetic neuropathy, mild cognitive impairment. X-ray of the hip demonstrates a comminuted displaced intertrochanteric left femoral fracture. Orthopedic surgery was consulted by the emergency department. She is being admitted for further management of closed displaced fracture of the left femoral neck. Review of Systems:     Constitutional:  negative for chills, fevers, sweats  Respiratory:  negative for cough, dyspnea on exertion, shortness of breath, wheezing  Cardiovascular:  negative for chest pain, chest pressure/discomfort, lower extremity edema, palpitations  Gastrointestinal:  negative for abdominal pain, constipation, diarrhea, nausea, vomiting  Neurological:  negative for dizziness, headache    Medications: Allergies: Allergies   Allergen Reactions    Aleve [Naproxen] Swelling       Current Meds:   Scheduled Meds:    insulin lispro  0-6 Units Subcutaneous TID WC    insulin lispro  0-3 Units Subcutaneous Nightly    apixaban  2.5 mg Oral BID    metFORMIN  500 mg Oral BID WC    atorvastatin  20 mg Oral Daily    sodium chloride flush  10 mL Intravenous 2 times per day    diphenhydrAMINE  25 mg Intravenous Once     Continuous Infusions:    dextrose       PRN Meds: glucose, dextrose, glucagon (rDNA), dextrose, sodium chloride flush, potassium chloride **OR** potassium alternative oral replacement **OR** potassium chloride, magnesium sulfate, promethazine **OR** ondansetron, polyethylene glycol, nicotine, acetaminophen **OR** acetaminophen, morphine **OR** morphine    Data:     Past Medical History:   has a past medical history of Diabetes mellitus ([address] Hyperlipidemia, and Hypertension. Social History:   reports that she has never smoked.  She has never used smokeless tobacco. She reports that she does not drink alcohol or use drugs. Family History: History reviewed. No pertinent family history. Vitals:  BP (!) 137/52   Pulse 78   Temp 97.5 °F (36.4 °C) (Oral)   Resp 16   Ht 5' 5\" (1.651 m)   Wt 212 lb (96.2 kg)   SpO2 99%   BMI 35.28 kg/m²   Temp (24hrs), Av.2 °F (36.8 °C), Min:97.5 °F (36.4 °C), Max:99.1 °F (37.3 °C)    Recent Labs     20  1619 20  0616 20  1121   POCGLU 179* 209* 151* 245*       I/O (24Hr): Intake/Output Summary (Last 24 hours) at 2020 1304  Last data filed at 2020 0340  Gross per 24 hour   Intake 310 ml   Output --   Net 310 ml       Labs:  Hematology:  Recent Labs     20  0858 20  0927 20  0927 20  2242 20  0623 20  1107   WBC 3.4* 3.8  --   --   --   --    RBC 2.40* 2.24*  --   --   --   --    HGB 7.5* 7.1*   < > 6.7* 7.9* 8.5*   HCT 24.2* 22.6*   < > 21.2* 24.2* 26.6*   .8 100.9  --   --   --   --    MCH 31.3 31.7  --   --   --   --    MCHC 31.0 31.4  --   --   --   --    RDW 13.6 13.6  --   --   --   --     127*  --   --   --   --    MPV 9.5 9.3  --   --   --   --     < > = values in this interval not displayed. Chemistry:  Recent Labs     20  1947   TROPHS 12     Recent Labs     20  0644 20  1151 20  1619 20  2104 20  0616 20  1121   POCGLU 148* 154* 179* 209* 151* 245*     ABG:No results found for: POCPH, PHART, PH, POCPCO2, KGQ4FNE, PCO2, POCPO2, PO2ART, PO2, POCHCO3, KJI1WOW, HCO3, NBEA, PBEA, BEART, BE, THGBART, THB, KXQ3KVF, WAIN2PFL, K8BHEEAY, O2SAT, FIO2  No results found for: SPECIAL  No results found for: CULTURE    Radiology:  Xr Femur Left (min 2 Views)    Result Date: 2020  Comminuted, displaced intertrochanteric left femoral fracture. Xr Hip 2-3 Vw W Pelvis Left    Result Date: 2020  Comminuted, displaced intertrochanteric left femoral fracture.        Physical Examination:        General appearance:  alert, cooperative

## 2020-12-29 PROBLEM — D64.9 ANEMIA, NORMOCYTIC NORMOCHROMIC: Status: ACTIVE | Noted: 2020-12-29

## 2020-12-29 PROBLEM — D62 ACUTE POSTOPERATIVE ANEMIA DUE TO EXPECTED BLOOD LOSS: Status: ACTIVE | Noted: 2020-12-29

## 2020-12-29 LAB
ABO/RH: NORMAL
ANTIBODY SCREEN: NEGATIVE
ARM BAND NUMBER: NORMAL
BLD PROD TYP BPU: NORMAL
CROSSMATCH RESULT: NORMAL
DISPENSE STATUS BLOOD BANK: NORMAL
EXPIRATION DATE: NORMAL
GLUCOSE BLD-MCNC: 158 MG/DL (ref 65–105)
GLUCOSE BLD-MCNC: 166 MG/DL (ref 65–105)
GLUCOSE BLD-MCNC: 175 MG/DL (ref 65–105)
GLUCOSE BLD-MCNC: 203 MG/DL (ref 65–105)
HCT VFR BLD CALC: 24 % (ref 36.3–47.1)
HEMOGLOBIN: 7.8 G/DL (ref 11.9–15.1)
MCH RBC QN AUTO: 31.1 PG (ref 25.2–33.5)
MCHC RBC AUTO-ENTMCNC: 32.5 G/DL (ref 28.4–34.8)
MCV RBC AUTO: 95.6 FL (ref 82.6–102.9)
NRBC AUTOMATED: 0 PER 100 WBC
PDW BLD-RTO: 15 % (ref 11.8–14.4)
PLATELET # BLD: 156 K/UL (ref 138–453)
PMV BLD AUTO: 9.1 FL (ref 8.1–13.5)
RBC # BLD: 2.51 M/UL (ref 3.95–5.11)
TRANSFUSION STATUS: NORMAL
UNIT DIVISION: 0
UNIT NUMBER: NORMAL
WBC # BLD: 3.3 K/UL (ref 3.5–11.3)

## 2020-12-29 PROCEDURE — 1200000000 HC SEMI PRIVATE

## 2020-12-29 PROCEDURE — 85027 COMPLETE CBC AUTOMATED: CPT

## 2020-12-29 PROCEDURE — 6370000000 HC RX 637 (ALT 250 FOR IP): Performed by: ORTHOPAEDIC SURGERY

## 2020-12-29 PROCEDURE — 2580000003 HC RX 258: Performed by: STUDENT IN AN ORGANIZED HEALTH CARE EDUCATION/TRAINING PROGRAM

## 2020-12-29 PROCEDURE — 82947 ASSAY GLUCOSE BLOOD QUANT: CPT

## 2020-12-29 PROCEDURE — 99232 SBSQ HOSP IP/OBS MODERATE 35: CPT | Performed by: INTERNAL MEDICINE

## 2020-12-29 PROCEDURE — 97530 THERAPEUTIC ACTIVITIES: CPT

## 2020-12-29 PROCEDURE — 6370000000 HC RX 637 (ALT 250 FOR IP): Performed by: INTERNAL MEDICINE

## 2020-12-29 PROCEDURE — 97110 THERAPEUTIC EXERCISES: CPT

## 2020-12-29 PROCEDURE — 97116 GAIT TRAINING THERAPY: CPT

## 2020-12-29 PROCEDURE — 97535 SELF CARE MNGMENT TRAINING: CPT

## 2020-12-29 PROCEDURE — 36415 COLL VENOUS BLD VENIPUNCTURE: CPT

## 2020-12-29 RX ORDER — POLYETHYLENE GLYCOL 3350 17 G/17G
17 POWDER, FOR SOLUTION ORAL DAILY PRN
Qty: 527 G | Refills: 1 | DISCHARGE
Start: 2020-12-29 | End: 2021-01-28

## 2020-12-29 RX ORDER — OXYCODONE HYDROCHLORIDE AND ACETAMINOPHEN 5; 325 MG/1; MG/1
1 TABLET ORAL EVERY 6 HOURS PRN
Qty: 20 TABLET | Refills: 0 | Status: SHIPPED | OUTPATIENT
Start: 2020-12-29 | End: 2021-01-05

## 2020-12-29 RX ADMIN — INSULIN LISPRO 1 UNITS: 100 INJECTION, SOLUTION INTRAVENOUS; SUBCUTANEOUS at 09:38

## 2020-12-29 RX ADMIN — APIXABAN 2.5 MG: 2.5 TABLET, FILM COATED ORAL at 21:52

## 2020-12-29 RX ADMIN — OXYCODONE AND ACETAMINOPHEN 1 TABLET: 5; 325 TABLET ORAL at 09:39

## 2020-12-29 RX ADMIN — Medication 10 ML: at 15:23

## 2020-12-29 RX ADMIN — ATORVASTATIN CALCIUM 20 MG: 20 TABLET, FILM COATED ORAL at 21:52

## 2020-12-29 RX ADMIN — METFORMIN HYDROCHLORIDE 500 MG: 500 TABLET ORAL at 18:44

## 2020-12-29 RX ADMIN — METFORMIN HYDROCHLORIDE 500 MG: 500 TABLET ORAL at 09:39

## 2020-12-29 RX ADMIN — INSULIN LISPRO 1 UNITS: 100 INJECTION, SOLUTION INTRAVENOUS; SUBCUTANEOUS at 18:44

## 2020-12-29 RX ADMIN — OXYCODONE AND ACETAMINOPHEN 2 TABLET: 5; 325 TABLET ORAL at 15:23

## 2020-12-29 RX ADMIN — Medication 10 ML: at 21:55

## 2020-12-29 RX ADMIN — INSULIN LISPRO 1 UNITS: 100 INJECTION, SOLUTION INTRAVENOUS; SUBCUTANEOUS at 21:52

## 2020-12-29 RX ADMIN — APIXABAN 2.5 MG: 2.5 TABLET, FILM COATED ORAL at 09:39

## 2020-12-29 ASSESSMENT — ENCOUNTER SYMPTOMS
ABDOMINAL PAIN: 0
NAUSEA: 0
VOMITING: 0
COUGH: 0
SHORTNESS OF BREATH: 0

## 2020-12-29 ASSESSMENT — PAIN SCALES - GENERAL
PAINLEVEL_OUTOF10: 0
PAINLEVEL_OUTOF10: 6

## 2020-12-29 NOTE — CARE COORDINATION
Social work: spoke to Daughter in person who provided the two phone numbers to call when precert comes in. Daughter has to go home to [employer]. Her phone is [phone] and pt's sister Beronica Kan is going to help while she is gone [phone]. Call daughter first then sister. Allyson Garza is trying for [employer]. Await precert possibly tomorrow. Will do hens. Will need rocio and Rx if any controlled meds ordered at discharge. Rafael marinelli    Social work; hens is completed and faxed to snf. Negative covid test 12-24-20 will fax to snf.  Rafael marinelli

## 2020-12-29 NOTE — PROGRESS NOTES
Physical Therapy  Facility/Department: [facility] MED SURG  Daily Treatment Note  NAME: Trinity Becerra  : 1935  MRN: [id number]    Date of Service: 2020    Discharge Recommendations:  Subacute/Skilled Nursing Facility   Assessment   Body structures, Functions, Activity limitations: Decreased functional mobility ; Decreased cognition;Decreased endurance;Decreased ROM; Decreased ADL status; Decreased strength;Decreased balance;Decreased safe awareness; Increased pain  Assessment: Pt will benefit from continued skilled PT to address balance, strength, activity tolerance, ROM, and safe mobility. Recommend SNF this date due to increased assist with all mobility and safety concerns if pt were to return home. Specific instructions for Next Treatment: advance mobility as tolerated  Prognosis: Good  Decision Making: Medium Complexity  Clinical Presentation: evolving  REQUIRES PT FOLLOW UP: Yes  Activity Tolerance  Activity Tolerance: Patient limited by endurance; Patient limited by pain; Patient limited by fatigue     Patient Diagnosis(es): The encounter diagnosis was Closed fracture of left hip, initial encounter ([facility] has a past medical history of Diabetes mellitus ([facility] Hyperlipidemia, and Hypertension. has a past surgical history that includes Cholecystectomy; Appendectomy; and Hip fracture surgery (Left, 2020).     Restrictions  Restrictions/Precautions  Restrictions/Precautions: Weight Bearing, Fall Risk, Up as Tolerated  Required Braces or Orthoses?: No  Lower Extremity Weight Bearing Restrictions  Left Lower Extremity Weight Bearing: Weight Bearing As Tolerated  Position Activity Restriction  Other position/activity restrictions: RUE IV, full WBAT on LLE, 2 assist, telemetry  Subjective   General  Chart Reviewed: Yes  Family / Caregiver Present: Yes  Subjective  Subjective: Pt agreeable to PT session  General Comment  Comments: RN approves therapy session  Pain Screening  Patient Currently in Pain: Yes  Vital Signs  Patient Currently in Pain: Yes       Orientation  Orientation  Overall Orientation Status: Within Functional Limits  Objective   Bed mobility  Rolling: Maximal assistance  Supine to sit: Maximal assistance  Scooting: Moderate assistance  Transfers  Sit to Stand: Moderate Assistance;2 Person Assistance  Stand to sit: Moderate Assistance;2 Person Assistance  Bed to Chair: Minimal assistance  Lateral Transfers: Minimal Assistance  Comment: Verbal cues for hand placement with transfers  Ambulation  Ambulation?: Yes  More Ambulation?: Yes  Ambulation 1  Surface: level tile  Device: Rolling Walker  Assistance: Minimal assistance; Moderate assistance  Quality of Gait: unsteady initial steps, difficulty advancing L LE, guarded, Verbal cues for sequencing with RW  Gait Deviations: Slow Laura;Decreased step height;Decreased step length  Distance: 15ft  Comments: ambulation to commode  Ambulation 2  Surface - 2: level tile  Device 2: Rolling Walker  Assistance 2: Minimal assistance  Quality of Gait 2: improved gait second walk, slow and guarded  Gait Deviations: Slow Laura;Decreased step length;Decreased step height  Distance: 15ft  Comments: up to chair  Stairs/Curb  Stairs?: No     Balance  Posture: Fair  Sitting - Static: Good  Sitting - Dynamic: Good  Standing - Static: Fair  Standing - Dynamic: Fair  Exercises   CGA to Min A for sitting and standing  balance in bathroom while bathing.        AM-PAC Score     AM-PAC Inpatient Mobility without Stair Climbing Raw Score : 11 (12/29/20 1132)  AM-PAC Inpatient without Stair Climbing T-Scale Score : 35.66 (12/29/20 1132)  Mobility Inpatient CMS 0-100% Score: 67.36 (12/29/20 1132)  Mobility Inpatient without Stair CMS G-Code Modifier : CL (12/29/20 1132)       Goals  Short term goals  Time Frame for Short term goals: 12 visits  Short term goal 1: Pt will perform all bed mobility with Esmer and use of handrails  Short term goal 2: Pt will perform all functional transfers with Esmer and correct hand placements on RW  Short term goal 3: Pt will ambulate 50ft with RW and SBA  Short term goal 4: Pt will tolerate 25mins of PT including therex, theract and gait training to improve functional mobility and activity tolerance. Patient Goals   Patient goals : To go home    Plan    Plan  Times per week: 1-2x day / 5-6 days per week  Specific instructions for Next Treatment: advance mobility as tolerated  Current Treatment Recommendations: Strengthening, Transfer Training, Endurance Training, Cognitive Reorientation, ROM, Pain Management, Balance Training, Gait Training, Functional Mobility Training, Safety Education & Training, Positioning  Safety Devices  Type of devices: Nurse notified, Left in chair, Gait belt, Chair alarm in place, Call light within reach, All fall risk precautions in place     Therapy Time   Individual Concurrent Group Co-treatment   Time In  [phone]   Time Out  0906      0937   Minutes  12      [address] with OT warranted secondary to decreased safety and independence requiring 2 skilled therapy professionals to address individual discipline's goals. PT addressing \"pre gait trunk strengthening\",\"weight shifting prior to transfers\",\"transfer training\",\"postural control in sitting\".   Ame Cueva, PTA

## 2020-12-29 NOTE — PROGRESS NOTES
[facility]    Office: [address], DO, Mando Garsia, DO, Taryn Potts, DO, [facility], DO, Daylin Meyer MD, Ayesha Fishman MD, Jose Sethi MD, Herb Castillo MD, Manjinder Nguyen MD, Annabelle Weaver MD, Diana Ortiz MD, Patricio Bumpers, MD, Juliana Weaver MD, Lesley Simon DO, Joyce Lemus MD, Carolyne Mckeon MD, Jason Flores DO, Davis Collier MD,  Ruben Robbins, DO, Amor Snow MD, Reggie Nicholas MD, Nery Layton [facility], [facility], CNP, Raz Pacheco, CNP, Hadley Srivastava, CNS, Shelly Hagen, CNP, Bettye Brown, CNP, Soledad Meeks, CNP, Faby Scruggs, CNP, Opal Do, CNP, Gypsy Ni PA-C, Marcello Kayser, [facility], Saad Renner CNP, Denver Macadam, [facility], Jose Enrique Britton, [facility], John Black, CNP, Damaso Macias, [address]    Progress Note    12/29/2020    10:14 AM    Name:   Evelyn Urena  MRN:     [id number]     [place]:      [de-identified]   Room:   2024/202401   Day:  5  Admit Date:  12/24/2020  8:57 PM    PCP:   Renny Edmonds MD  Code Status:  Full Code    Subjective:     C/C:   Chief Complaint   Patient presents with    Leg Pain     fell this evening       Interval History Status:  Pain better controlled  Ambulating with walker  Doing okay with diet  Awaiting skilled nursing facility    Data Base Updates:  Marylu Meyer     [id number] [address] 51Low m/uL Hemoglobin7.8Low       Brief History:     As documented in the medical record:  \"Irene Liu is a 81 y.o. Non-/non  female who presents with Leg Pain (fell this evening)   and is admitted to the hospital for the management of Closed displaced fracture of left femoral neck ([address] Patient presented to the emergency department after a fall with a chief complaint of left hip and leg pain. She states she was going down the steps when her shoe slipped and she fell. She did not hit her head or lose consciousness.   The fall happened around 7 PM.  She denies any other injuries. She has a history of hypertension, hyperlipidemia, OCD, diabetic neuropathy, mild cognitive impairment. X-ray of the hip demonstrates a comminuted displaced intertrochanteric left femoral fracture. Orthopedic surgery was consulted by the emergency department. She is being admitted for further management of closed displaced fracture of the left femoral neck. \"     The patient was admitted    The patient underwent:  Procedure: CFN Left femur     Blood sugars were monitored and controlled  Her H&H or observed    The patient's condition was stabilized  Discharge planning initiated      Medications: Allergies: Allergies   Allergen Reactions    Aleve [Naproxen] Swelling       Current Meds:   Scheduled Meds:    insulin lispro  0-6 Units Subcutaneous TID WC    insulin lispro  0-3 Units Subcutaneous Nightly    apixaban  2.5 mg Oral BID    metFORMIN  500 mg Oral BID WC    atorvastatin  20 mg Oral Daily    sodium chloride flush  10 mL Intravenous 2 times per day    diphenhydrAMINE  25 mg Intravenous Once     Continuous Infusions:    dextrose       PRN Meds: oxyCODONE-acetaminophen **OR** oxyCODONE-acetaminophen, glucose, dextrose, glucagon (rDNA), dextrose, sodium chloride flush, potassium chloride **OR** potassium alternative oral replacement **OR** potassium chloride, magnesium sulfate, promethazine **OR** ondansetron, polyethylene glycol, nicotine, acetaminophen **OR** acetaminophen    Data:     Past Medical History:   has a past medical history of Diabetes mellitus ([address] Hyperlipidemia, and Hypertension. Social History:   reports that she has never smoked. She has never used smokeless tobacco. She reports that she does not drink alcohol or use drugs. Family History: History reviewed. No pertinent family history. Review of Systems:     Review of Systems   Constitutional: Positive for activity change (Improving).    Respiratory: Negative for cough and shortness of breath. Cardiovascular: Negative for chest pain and palpitations. Gastrointestinal: Negative for abdominal pain, nausea and vomiting. Genitourinary: Negative for flank pain and hematuria. Musculoskeletal: Positive for arthralgias, gait problem and myalgias. Her hip is still stiff and sore  Doing better with ambulation   Neurological: Positive for numbness (Known neuropathy). Negative for dizziness and light-headedness. Physical Examination:        Physical Exam  Vitals signs reviewed. Constitutional:       General: She is not in acute distress. Appearance: She is not diaphoretic. HENT:      Head: Normocephalic. Nose: Nose normal.   Eyes:      General: No scleral icterus. Conjunctiva/sclera: Conjunctivae normal.   Neck:      Musculoskeletal: Neck supple. Trachea: No tracheal deviation. Cardiovascular:      Rate and Rhythm: Normal rate and regular rhythm. Pulmonary:      Effort: Pulmonary effort is normal. No respiratory distress. Breath sounds: Normal breath sounds. No wheezing or rales. Chest:      Chest wall: No tenderness. Abdominal:      General: Bowel sounds are normal. There is no distension. Palpations: Abdomen is soft. Tenderness: There is no abdominal tenderness. Musculoskeletal:         General: Tenderness (Incisional) present. Skin:     General: Skin is warm and dry. Comments: Wound dressed dry and clean   Neurological:      Mental Status: She is alert. Vitals:  BP (!) 145/54   Pulse 71   Temp 98.8 °F (37.1 °C) (Axillary)   Resp 18   Ht 5' 5\" (1.651 m)   Wt 212 lb (96.2 kg)   SpO2 97%   BMI 35.28 kg/m²   Temp (24hrs), Av °F (36.7 °C), Min:97.5 °F (36.4 °C), Max:98.8 °F (37.1 °C)    Recent Labs     20  1121 20  1611 207 20  0608   POCGLU 245* 185* 198* 166*       I/O (24Hr):     Intake/Output Summary (Last 24 hours) at 2020 1014  Last data filed at 2020 2560  Gross per 24 hour   Intake 100 ml   Output --   Net 100 ml       Labs:  Hematology:  Recent Labs     12/27/20  0927 12/27/20  0927 12/28/20  0623 12/28/20  1107 12/29/20  0606   WBC 3.8  --   --   --  3.3*   RBC 2.24*  --   --   --  2.51*   HGB 7.1*   < > 7.9* 8.5* 7.8*   HCT 22.6*   < > 24.2* 26.6* 24.0*   .9  --   --   --  95.6   MCH 31.7  --   --   --  31.1   MCHC 31.4  --   --   --  32.5   RDW 13.6  --   --   --  15.0*   *  --   --   --  156   MPV 9.3  --   --   --  9.1    < > = values in this interval not displayed. Chemistry:No results for input(s): NA, K, CL, CO2, GLUCOSE, BUN, CREATININE, MG, ANIONGAP, LABGLOM, GFRAA, CALCIUM, CAION, PHOS, PSA, PROBNP, TROPHS, CKTOTAL, CKMB, CKMBINDEX, MYOGLOBIN, DIGOXIN, LACTACIDWB in the last 72 hours. Recent Labs     12/27/20  2104 12/28/20  0616 12/28/20  1121 12/28/20  1611 12/28/20  2057 12/29/20  0608   POCGLU 209* 151* 245* 185* 198* 166*     ABG:No results found for: POCPH, PHART, PH, POCPCO2, AGV9IDA, PCO2, POCPO2, PO2ART, PO2, POCHCO3, LIC5DXL, HCO3, NBEA, PBEA, BEART, BE, THGBART, THB, TWE5PJA, VJSU6PCR, C0NNSRYX, O2SAT, FIO2  No results found for: SPECIAL  No results found for: CULTURE    Radiology:  Xr Pelvis (1-2 Views)    Result Date: 12/25/2020  Postoperative left hip internal fixation. Xr Femur Left (min 2 Views)    Result Date: 12/25/2020  Postoperative left hip internal fixation. Xr Femur Left (min 2 Views)    Result Date: 12/24/2020  Comminuted, displaced intertrochanteric left femoral fracture. Xr Hip 2-3 Vw W Pelvis Left    Result Date: 12/24/2020  Comminuted, displaced intertrochanteric left femoral fracture.          Assessment:        Principal Problem:    Closed displaced fracture of left femoral neck (HCC)  Active Problems:    Diabetic neuropathy (HCC)    Essential hypertension    Mixed hyperlipidemia    Mild cognitive impairment    Obsessive-compulsive disorder    Moderate aortic stenosis    Anemia, normocytic normochromic    Acute postoperative anemia due to expected blood loss    Uncontrolled type 2 diabetes mellitus with hyperglycemia (HCC)    Closed fracture of left hip (HCC)  Resolved Problems:    * No resolved hospital problems. *      Plan:        Orthopedic evaluation and treatment, f/u Dr Shalini Vora   Physical therapy / rehab  DVT prophylaxis: Eliquis  Pain management   Glycemic contol - monitor and control blood sugars  Blood Pressure - Monitor and control  Blood products prn - will check H&H  Anemia w/u on outpatient basis is suggested   Discharge planning  Med rec done  KODY done  Will discharge when arrangements complete and ok with other services.   Follow-up with PCP in one week, Xuan Johnson MD  Notify PCP of discharge   DCP 34 minutes plus    IP CONSULT TO ORTHOPEDIC SURGERY  IP CONSULT TO HOSPITALIST  IP CONSULT TO PHARMACY  IP CONSULT TO DO Ginny  12/29/2020  10:14 AM

## 2020-12-29 NOTE — PLAN OF CARE
Problem: Pain:  Goal: Pain level will decrease  Description: Pain level will decrease  Outcome: Ongoing  Goal: Control of acute pain  Description: Control of acute pain  Outcome: Ongoing     Problem: Skin Integrity:  Goal: Will show no infection signs and symptoms  Description: Will show no infection signs and symptoms  Outcome: Ongoing  Goal: Absence of new skin breakdown  Description: Absence of new skin breakdown  Outcome: Ongoing     Problem: Falls - Risk of:  Goal: Will remain free from falls  Description: Will remain free from falls  12/29/2020 0145 by Jordy Tejada RN  Outcome: Ongoing  12/28/2020 1830 by Jack Hull RN  Outcome: Ongoing  Goal: Absence of physical injury  Description: Absence of physical injury  12/29/2020 0145 by Jordy Tejada RN  Outcome: Ongoing  12/28/2020 1830 by Jack Hull RN  Outcome: Ongoing     Problem: Infection - Surgical Site:  Goal: Will show no infection signs and symptoms  Description: Will show no infection signs and symptoms  Outcome: Ongoing

## 2020-12-29 NOTE — PROGRESS NOTES
Occupational Therapy  Facility/Department: [facility]  Daily Treatment Note  NAME: Opal Penaloza  : 1935  MRN: [id number]    Date of Service: 2020    Discharge Recommendations:  Subacute/Skilled Nursing Facility       Assessment   Performance deficits / Impairments: Decreased functional mobility ; Decreased safe awareness;Decreased ADL status; Decreased cognition;Decreased high-level IADLs;Decreased endurance;Decreased strength  Assessment: Pt would benefit from skilled OT while IP with continued therapy in SNF at d/c to return to prior level of indp before returning home  Prognosis: Good  REQUIRES OT FOLLOW UP: Yes  Activity Tolerance  Activity Tolerance: Patient Tolerated treatment well;Patient limited by pain  Activity Tolerance: P+/F-  Safety Devices  Safety Devices in place: Yes  Type of devices: All fall risk precautions in place; Left in chair;Call light within reach;Nurse notified; Chair alarm in place;Gait belt;Patient at risk for falls         Patient Diagnosis(es): The encounter diagnosis was Closed fracture of left hip, initial encounter ([facility] [address] has a past medical history of Diabetes mellitus ([facility] [address] Hyperlipidemia, and Hypertension. has a past surgical history that includes Cholecystectomy; Appendectomy; and Hip fracture surgery (Left, 2020).     Restrictions  Restrictions/Precautions  Restrictions/Precautions: Weight Bearing, Fall Risk, Up as Tolerated  Required Braces or Orthoses?: No  Lower Extremity Weight Bearing Restrictions  Left Lower Extremity Weight Bearing: Weight Bearing As Tolerated  Position Activity Restriction  Other position/activity restrictions: RUE IV, full WBAT on LLE, 2 assist, telemetry  Subjective   General  Chart Reviewed: Yes  Patient assessed for rehabilitation services?: Yes  Response to previous treatment: Patient with no complaints from previous session  Family / Caregiver Present: No  Subjective  Subjective: Pt less talkative this date, requested pain meds several times, RN aware and gave at end of session. Orientation  Orientation  Overall Orientation Status: Within Functional Limits  Objective    ADL  Grooming: Setup(for oral care seated in bedside chair)  UE Bathing: Setup;Minimal assistance;Verbal cueing  LE Bathing: Setup;Maximum assistance;Dependent/Total;Verbal cueing  UE Dressing: Setup;Minimal assistance  LE Dressing: Setup;Maximum assistance;Dependent/Total  Toileting: Maximum assistance;Dependent/Total  Additional Comments: Pt sat on toilet to wash up using IRVIN HEX 4 surgical soap. Balance  Sitting Balance: Stand by assistance  Standing Balance: Minimal assistance  Standing Balance  Time: 2-3 min  Activity: self care  Functional Mobility  Functional - Mobility Device: Rolling Walker  Activity: To/from bathroom  Assist Level: Minimal assistance  Functional Mobility Comments: Pt req'd verbal cues for sequencing steps/walker. Pt is slow moving and req'd extra time for all tasks. Toilet Transfers  Toilet - Technique: Ambulating  Equipment Used: Standard toilet  Toilet Transfer: Moderate assistance;2 Person assistance  Toilet Transfers Comments: Max verbal/tactile cues for hand placement and overall safety/sequenicng. Transfers  Stand Step Transfers: Minimal assistance  Sit to stand: Moderate assistance;2 Person assistance  Stand to sit: Moderate assistance;2 Person assistance  Transfer Comments: Max verbal/tactile cues for hand placement and overall safety/sequenicng. Cognition  Overall Cognitive Status: Exceptions  Arousal/Alertness: Appropriate responses to stimuli  Following Commands: Follows one step commands with repetition; Follows one step commands with increased time  Attention Span: Appears intact  Memory: Decreased short term memory  Safety Judgement: Decreased awareness of need for assistance;Decreased awareness of need for safety  Problem Solving: Assistance required to identify errors made;Assistance required to correct errors made;Decreased awareness of errors  Insights: Decreased awareness of deficits  Initiation: Requires cues for all  Sequencing: Requires cues for all  Cognition Comment: Pt cooperative.      Perception  Overall Perceptual Status: [facility]                                   Plan   Plan  Times per week: 5-6x/week  Times per day: Daily  Specific instructions for Next Treatment: Cog screen  Current Treatment Recommendations: Strengthening, Endurance Training, Patient/Caregiver Education & Training, Self-Care / ADL, Equipment Evaluation, Education, & procurement, Functional Mobility Training                        AM-PAC Score        AM-PAC Inpatient Daily Activity Raw Score: 14 (12/29/20 1102)  AM-PAC Inpatient ADL T-Scale Score : 33.39 (12/29/20 1102)  ADL Inpatient CMS 0-100% Score: 59.67 (12/29/20 1102)  ADL Inpatient CMS G-Code Modifier : CK (12/29/20 1102)    Goals  Short term goals  Time Frame for Short term goals: By d/c, pt will  Short term goal 1: demo Min A for all bed mob with use of bedrails/controls as needed  Short term goal 2: demo Mod A for ADL transfers with good technique  Short term goal 3: demo Mod A functional mob at room distances with good safety/pacing and approp use of DME  Short term goal 4: demo Min A for UB ADLs and Mod A for LB ADLs with use of AD/DME as needed  Short term goal 5: demo and verb good understanding of all educ provided on fall prevention, EC/WS, and possible equip needs for the home  Long term goals  Long term goal 1: demo indp with UB HEP to increase strength for improved participation in self-care and improved comfort with RW use  Patient Goals   Patient goals : Pt would like to recover in rehab before returning home       Therapy Time   Individual Concurrent Group Co-treatment   Time In [phone]   Time Out 0945     0937   Minutes 8     31     Co-treatment with PT warranted secondary to decreased safety and independence requiring 2 skilled therapy professionals to address individual discipline's goals. OT addressing \"preparation for ADL transfer\",\"sitting balance for increased ADL performance\",\"sitting/activity tolerance\",\"functional reaching\",\"environmental safety/scanning\",\"fall prevention\",\"functional mobility for ADL transfers\",\"ability to sequence and follow directions\",\"functional UE strength\".          Dre Fong XIMENA

## 2020-12-30 VITALS
SYSTOLIC BLOOD PRESSURE: 135 MMHG | DIASTOLIC BLOOD PRESSURE: 50 MMHG | HEIGHT: 65 IN | OXYGEN SATURATION: 95 % | BODY MASS INDEX: 35.65 KG/M2 | WEIGHT: 214 LBS | RESPIRATION RATE: 18 BRPM | HEART RATE: 75 BPM | TEMPERATURE: 98.2 F

## 2020-12-30 LAB
GLUCOSE BLD-MCNC: 142 MG/DL (ref 65–105)
GLUCOSE BLD-MCNC: 148 MG/DL (ref 65–105)

## 2020-12-30 PROCEDURE — 6370000000 HC RX 637 (ALT 250 FOR IP): Performed by: INTERNAL MEDICINE

## 2020-12-30 PROCEDURE — 2580000003 HC RX 258: Performed by: STUDENT IN AN ORGANIZED HEALTH CARE EDUCATION/TRAINING PROGRAM

## 2020-12-30 PROCEDURE — 82947 ASSAY GLUCOSE BLOOD QUANT: CPT

## 2020-12-30 PROCEDURE — 97116 GAIT TRAINING THERAPY: CPT

## 2020-12-30 PROCEDURE — 97535 SELF CARE MNGMENT TRAINING: CPT

## 2020-12-30 PROCEDURE — 6370000000 HC RX 637 (ALT 250 FOR IP): Performed by: ORTHOPAEDIC SURGERY

## 2020-12-30 PROCEDURE — 97110 THERAPEUTIC EXERCISES: CPT

## 2020-12-30 PROCEDURE — 97530 THERAPEUTIC ACTIVITIES: CPT

## 2020-12-30 RX ORDER — FUROSEMIDE 20 MG/1
20 TABLET ORAL DAILY
Status: DISCONTINUED | OUTPATIENT
Start: 2020-12-30 | End: 2020-12-30 | Stop reason: HOSPADM

## 2020-12-30 RX ADMIN — INSULIN LISPRO 1 UNITS: 100 INJECTION, SOLUTION INTRAVENOUS; SUBCUTANEOUS at 09:55

## 2020-12-30 RX ADMIN — INSULIN LISPRO 1 UNITS: 100 INJECTION, SOLUTION INTRAVENOUS; SUBCUTANEOUS at 12:50

## 2020-12-30 RX ADMIN — Medication 10 ML: at 09:55

## 2020-12-30 RX ADMIN — OXYCODONE AND ACETAMINOPHEN 2 TABLET: 5; 325 TABLET ORAL at 15:59

## 2020-12-30 RX ADMIN — OXYCODONE AND ACETAMINOPHEN 2 TABLET: 5; 325 TABLET ORAL at 03:54

## 2020-12-30 RX ADMIN — FUROSEMIDE 20 MG: 20 TABLET ORAL at 12:50

## 2020-12-30 RX ADMIN — OXYCODONE AND ACETAMINOPHEN 2 TABLET: 5; 325 TABLET ORAL at 09:54

## 2020-12-30 RX ADMIN — APIXABAN 2.5 MG: 2.5 TABLET, FILM COATED ORAL at 09:54

## 2020-12-30 RX ADMIN — METFORMIN HYDROCHLORIDE 500 MG: 500 TABLET ORAL at 09:54

## 2020-12-30 ASSESSMENT — PAIN SCALES - GENERAL
PAINLEVEL_OUTOF10: 0
PAINLEVEL_OUTOF10: 10
PAINLEVEL_OUTOF10: 0
PAINLEVEL_OUTOF10: 10

## 2020-12-30 ASSESSMENT — PAIN DESCRIPTION - FREQUENCY
FREQUENCY: INTERMITTENT
FREQUENCY: CONTINUOUS

## 2020-12-30 ASSESSMENT — PAIN DESCRIPTION - LOCATION
LOCATION: HIP

## 2020-12-30 ASSESSMENT — PAIN - FUNCTIONAL ASSESSMENT
PAIN_FUNCTIONAL_ASSESSMENT: PREVENTS OR INTERFERES SOME ACTIVE ACTIVITIES AND ADLS
PAIN_FUNCTIONAL_ASSESSMENT: PREVENTS OR INTERFERES SOME ACTIVE ACTIVITIES AND ADLS

## 2020-12-30 ASSESSMENT — PAIN DESCRIPTION - PROGRESSION
CLINICAL_PROGRESSION: GRADUALLY WORSENING
CLINICAL_PROGRESSION: NOT CHANGED

## 2020-12-30 ASSESSMENT — PAIN DESCRIPTION - PAIN TYPE
TYPE: SURGICAL PAIN
TYPE: SURGICAL PAIN

## 2020-12-30 ASSESSMENT — PAIN DESCRIPTION - ORIENTATION
ORIENTATION: LEFT
ORIENTATION: LEFT

## 2020-12-30 ASSESSMENT — PAIN DESCRIPTION - DESCRIPTORS: DESCRIPTORS: SHOOTING;SORE

## 2020-12-30 NOTE — CARE COORDINATION
Social work; spoke to daughter who is going to talk to snf to see what clothes are needed. Aidee freedman  Faxed rocio to snf also.  Aidee freedman

## 2020-12-30 NOTE — PROGRESS NOTES
Occupational Therapy  Facility/Department: [facility]  Daily Treatment Note  NAME: Tae Walker  : 1935  MRN: [id number]    Date of Service: 2020    Discharge Recommendations:  Subacute/Skilled Nursing Facility       Assessment   Performance deficits / Impairments: Decreased functional mobility ; Decreased safe awareness;Decreased ADL status; Decreased cognition;Decreased high-level IADLs;Decreased endurance;Decreased strength  Assessment: Pt would benefit from skilled OT while IP with continued therapy in SNF at d/c to return to prior level of indp before returning home  Prognosis: Good  OT Education: Family Education  Patient Education: Pt's sister present during treatment and stated that pt probably does not have much strength to begin with and that she uses a lift chair at home. Writer educated sister on the cons of using a lift chair and should be used more with people who have progessive diseases (i.e parkinsons, MS) and that in the long run lift chairs take away funtional movements and strength and then people are unable to stand up from other surfaces (i.e. toilet, cars, regular household chairs, etc) and pt should be discouraged from using the lift function of the chair. Pt's sister stated \"you know that saying, you can lead a horse to water but you can't make them drink. \"  REQUIRES OT FOLLOW UP: Yes  Activity Tolerance  Activity Tolerance: Patient limited by pain  Activity Tolerance: P+  Safety Devices  Safety Devices in place: Yes  Type of devices: All fall risk precautions in place; Left in chair;Call light within reach;Nurse notified; Chair alarm in place;Gait belt;Patient at risk for falls         Patient Diagnosis(es): The encounter diagnosis was Closed fracture of left hip, initial encounter ([facility] [address] has a past medical history of Diabetes mellitus ([facility] [address] Hyperlipidemia, and Hypertension. has a past surgical history that includes Cholecystectomy;  Appendectomy; and Hip fracture surgery (Left, 12/25/2020). Restrictions  Restrictions/Precautions  Restrictions/Precautions: Weight Bearing, Fall Risk, Up as Tolerated  Required Braces or Orthoses?: No  Lower Extremity Weight Bearing Restrictions  Left Lower Extremity Weight Bearing: Weight Bearing As Tolerated  Position Activity Restriction  Other position/activity restrictions: RUE IV, full WBAT on LLE, 2 assist, telemetry  Subjective   General  Chart Reviewed: Yes  Patient assessed for rehabilitation services?: Yes  Response to previous treatment: Patient with no complaints from previous session  Family / Caregiver Present: Yes(sister)  Subjective  Subjective: Pt appears more anxious and painful this date. Pt req'd encouragement to get OOB and is always at a 10/10 pain level. Pain Assessment  Pain Assessment: 0-10  Pain Level: 10  Pain Type: Surgical pain  Pain Location: Hip  Pain Orientation: Left  Pre Treatment Pain Screening  Intervention List: Patient able to continue with treatment;Nurse called to administer meds  Comments / Details: RN gave pain meds prior to getting pt out of bed. Vital Signs  Patient Currently in Pain: Yes   Orientation  Orientation  Overall Orientation Status: Within Functional Limits  Objective             Balance  Sitting Balance: Stand by assistance  Standing Balance: Minimal assistance(x2)  Functional Mobility  Functional - Mobility Device: Rolling Walker  Activity: Other  Assist Level: Moderate assistance(Min-Mod A x2)  Functional Mobility Comments: Pt completed functional mobility in room short distance using RW with Min-Mod A x2 for safety/balance. Pt is slow moving, extra time for all tasks.   Bed mobility  Rolling to Right: Maximum assistance(writer rolled pt to right side while RN changed bandages on L hip)  Supine to Sit: Maximum assistance;2 Person assistance(with HOB up and max verbal/tactile cues to reach for bed rail and overall technique/sequencing of movements.)  Scooting: Maximal assistance;2 Person assistance(to scoot pt to EOB and place feet on floor)  Transfers  Stand Step Transfers: Minimal assistance; Moderate assistance;2 Person assistance  Sit to stand: Minimal assistance; Moderate assistance;2 Person assistance  Stand to sit: Minimal assistance; Moderate assistance;2 Person assistance  Transfer Comments: Max verbal/tactile cues for hand placement and overall safety/sequenicng. Cognition  Overall Cognitive Status: Exceptions  Arousal/Alertness: Appropriate responses to stimuli  Following Commands: Follows one step commands with repetition; Follows one step commands with increased time  Attention Span: Appears intact  Memory: Decreased short term memory  Safety Judgement: Decreased awareness of need for assistance;Decreased awareness of need for safety  Problem Solving: Assistance required to identify errors made;Assistance required to correct errors made;Decreased awareness of errors  Insights: Decreased awareness of deficits  Initiation: Requires cues for all  Sequencing: Requires cues for all  Cognition Comment: Pt appears anxious and c/o 10/10 pain when asked what her pain level is but does not present as having 10/10 pain.      Perception  Overall Perceptual Status: Impaired  Initiation: Cues to initiate tasks                                   Plan   Plan  Times per week: 5-6x/week  Times per day: Daily  Specific instructions for Next Treatment: Cog screen  Current Treatment Recommendations: Strengthening, Endurance Training, Patient/Caregiver Education & Training, Self-Care / ADL, Equipment Evaluation, Education, & procurement, Functional Mobility Training                                   AM-PAC Score        AM-[facility] Inpatient Daily Activity Raw Score: 13 (12/30/20 1046)  AM-PAC Inpatient ADL T-Scale Score : 32.03 (12/30/20 1046)  ADL Inpatient CMS 0-100% Score: 63.03 (12/30/20 1046)  ADL Inpatient CMS G-Code Modifier : CL (12/30/20 1046)    Goals  Short term goals  Time Frame for Short term goals: By d/c, pt will  Short term goal 1: demo Min A for all bed mob with use of bedrails/controls as needed  Short term goal 2: demo Mod A for ADL transfers with good technique  Short term goal 3: demo Mod A functional mob at room distances with good safety/pacing and approp use of DME  Short term goal 4: demo Min A for UB ADLs and Mod A for LB ADLs with use of AD/DME as needed  Short term goal 5: demo and verb good understanding of all educ provided on fall prevention, EC/WS, and possible equip needs for the home  Long term goals  Long term goal 1: demo indp with UB HEP to increase strength for improved participation in self-care and improved comfort with RW use  Patient Goals   Patient goals : Pt would like to recover in rehab before returning home       Therapy Time   Individual Concurrent Group Co-treatment   Time In       0946   Time Out       1025   Minutes       44     Co-treatment with PT warranted secondary to decreased safety and independence requiring 2 skilled therapy professionals to address individual discipline's goals. OT addressing \"preparation for ADL transfer\",\"sitting balance for increased ADL performance\",\"sitting/activity tolerance\",\"functional reaching\",\"environmental safety/scanning\",\"fall prevention\",\"functional mobility for ADL transfers\",\"ability to sequence and follow directions\",\"functional UE strength\".          XIMENA Munoz

## 2020-12-30 NOTE — CARE COORDINATION
DC Planning    Spoke with LSW-pt. Set up for  at  3pm. Informed pt and her sister, LSW already informed dtr.

## 2020-12-30 NOTE — CARE COORDINATION
Social work: got auth for [facility]. Plan discharge at 3 pm today if transport can be set up. Working on that now.  Sasha freedman

## 2020-12-30 NOTE — PLAN OF CARE
Problem: Pain:  Goal: Pain level will decrease  Description: Pain level will decrease  12/30/2020 1217 by Manual [place], RN  Outcome: Ongoing  Note: Pt medicated with pain medication prn. Assessed all pain characteristics including level, type, location, frequency, and onset. Non-pharmacologic interventions offered to pt as well. Pt states pain is tolerable at this time. Will continue to monitor      Problem: Skin Integrity:  Goal: Will show no infection signs and symptoms  Description: Will show no infection signs and symptoms  12/30/2020 1217 by Manual [place], RN  Outcome: Ongoing  Note: Skin assessment complete. Waffle mattress in place. Coccyx reddened. Sensicare applied PRN. Turned and repositioned every two hours. Area kept free from moisture. Proper nourishment and fluids encouraged, as appropriate. Will continue to monitor for additional needs and changes in skin breakdown.

## 2020-12-30 NOTE — PROGRESS NOTES
Physical Therapy  Facility/Department: [facility]  Daily Treatment Note  NAME: [facility]  : 1935  MRN: [id number]    Date of Service: 2020    Discharge Recommendations:  Subacute/Skilled Nursing Facility   Assessment   Body structures, Functions, Activity limitations: Decreased functional mobility ; Decreased cognition;Decreased endurance;Decreased ROM; Decreased ADL status; Decreased strength;Decreased balance;Decreased safe awareness; Increased pain  Assessment: Pt will benefit from continued skilled PT to address balance, strength, activity tolerance, ROM, and safe mobility. Recommend SNF this date due to minimal to moderate assistance of 2 with all mobility and safety concerns if pt were to return home. Specific instructions for Next Treatment: advance mobility as tolerated  Prognosis: Good  REQUIRES PT FOLLOW UP: Yes  Activity Tolerance  Activity Tolerance: Patient limited by pain; Patient limited by endurance; Patient limited by fatigue     Patient Diagnosis(es): The encounter diagnosis was Closed fracture of left hip, initial encounter ([facility] [address] has a past medical history of Diabetes mellitus ([facility] [address] Hyperlipidemia, and Hypertension. has a past surgical history that includes Cholecystectomy; Appendectomy; and Hip fracture surgery (Left, 2020). Restrictions  Restrictions/Precautions  Restrictions/Precautions: Weight Bearing, Fall Risk, Up as Tolerated(Simultaneous filing. User may not have seen previous data.)  Required Braces or Orthoses?: No(Simultaneous filing. User may not have seen previous data.)  Lower Extremity Weight Bearing Restrictions  Left Lower Extremity Weight Bearing: Weight Bearing As Tolerated(Simultaneous filing. User may not have seen previous data.)  Position Activity Restriction  Other position/activity restrictions: RUE IV, full WBAT on LLE, 2 assist, telemetry(Simultaneous filing.  User may not have seen previous data.)  Subjective   General  Chart Reviewed: Yes  Family / Caregiver Present: Yes  Subjective  Subjective: With encouragement pt agreeable to PT session  General Comment  Comments: RN approves therapy session  Pain Screening  Patient Currently in Pain: Yes  Pain Assessment  Pain Assessment: 0-10  Pain Level: 10  Vital Signs  Patient Currently in Pain: Yes       Orientation  Orientation  Overall Orientation Status: Within Functional Limits  Objective   Bed mobility  Rolling: Maximal assistance;2 Person assistance  Supine to sit: Maximal assistance;2 Person assistance  Scooting: Maximal assistance;2 Person assistance  Transfers  Sit to Stand: Minimal Assistance; Moderate Assistance;2 Person Assistance  Stand to sit: Minimal Assistance; Moderate Assistance;2 Person Assistance  Bed to Chair: Minimal assistance; Moderate assistance;2 Person Assistance  Stand Pivot Transfers: Minimal Assistance; Moderate Assistance;2 Person Assistance  Lateral Transfers: Minimal Assistance; Moderate Assistance;2 Person Assistance  Comment: Verbal and tactile cues for hand placement with sit to stand transfers  Ambulation  Ambulation?: Yes  More Ambulation?: No  Ambulation 1  Surface: level tile  Device: Rolling Walker  Assistance: Minimal assistance;2 Person assistance  Quality of Gait: unsteady initial steps, slides L foot with initial steps, slow and guarded  Gait Deviations: Slow Laura;Decreased step length;Decreased step height  Distance: 20ft  Comments: Verbal cues for sequencing with RW  Stairs/Curb  Stairs?: No     Balance  Posture: Fair  Sitting - Static: Good  Sitting - Dynamic: Good  Standing - Static: Fair  Standing - Dynamic: Fair  Exercises  Quad Sets: 10(with tactile cues)  Heelslides: 10(with assist , limited ROM L LE with pt resisting movement)  Gluteal Sets: 10  Ankle Pumps: 20  Comments: Education on benefits of movement and  being OOB to prevent sedentary complications.       AM-PAC Score     AM-PAC Inpatient Mobility without Stair Climbing Raw Score : 10 (12/30/20 1046)  AM-PAC Inpatient without Stair Climbing T-Scale Score : 34.07 (12/30/20 1046)  Mobility Inpatient CMS 0-100% Score: 71.66 (12/30/20 1046)  Mobility Inpatient without Stair CMS G-Code Modifier : CL (12/30/20 1046)       Goals  Short term goals  Time Frame for Short term goals: 12 visits  Short term goal 1: Pt will perform all bed mobility with Esmer and use of handrails  Short term goal 2: Pt will perform all functional transfers with Esmer and correct hand placements on RW  Short term goal 3: Pt will ambulate 50ft with RW and SBA  Short term goal 4: Pt will tolerate 25mins of PT including therex, theract and gait training to improve functional mobility and activity tolerance. Patient Goals   Patient goals : To go home    Plan    Plan  Times per week: 1-2x day / 5-6 days per week  Specific instructions for Next Treatment: advance mobility as tolerated  Current Treatment Recommendations: Strengthening, Transfer Training, Endurance Training, Cognitive Reorientation, ROM, Pain Management, Balance Training, Gait Training, Functional Mobility Training, Safety Education & Training, Positioning  Safety Devices  Type of devices: Nurse notified, Left in chair, Gait belt, Chair alarm in place, Call light within reach, All fall risk precautions in place     Therapy Time   Individual Concurrent Group Co-treatment   Time In        0946   Time Out       1025   Minutes        44         Co-treatment with OT warranted secondary to decreased safety and independence requiring 2 skilled therapy professionals to address individual discipline's goals. PT addressing \"pre gait trunk strengthening\",\"weight shifting prior to transfers\",\"transfer training\",\"postural control in sitting\".   Phuong Piedra, PTA

## 2020-12-31 NOTE — DISCHARGE SUMMARY
[facility]    Office: [address], DO, Bethjurgen Devine, DO, Gopal Mcnally, DO, Kalpana  Blood, DO, Cony Keene MD, Sulaiman Day MD, Mo العلي MD, Melodie Vences MD, Tanna Melo MD, Dunia Mehta MD, Archana Bowers MD, Navneet Rod MD, Mbonu Ashlyn Villafuerte MD, Rocco Preston DO, Roberto Schwartz MD, Reji Suresh MD, Jessy Armstrong DO, Emily Stewart MD,  Haley Rogers DO, Kim Crowell MD, Jackie Malone MD, Yamil Ortiz, [facility], [facility]ino[facility], CNP, Missy Skinner, CNP, Veronica Ramirez, CNS, Luz Elena Curiel, CNP, Ellen Corral, CNP, Jos Cox, CNP, Esdras Montelongo, CNP, Maynor Millan, CNP, Michelle Reese PA-C, Danny Vaca, DNP, Parisa Urban, CNP, Tori Olivares, CNP, Salud Moreira, CNP, Thao Velasquez, CNP, Marisol Green, CNP    [facility] 137    Discharge Summary    Patient ID: Ivan Zuñiga  :     MRN: [id number]     ACCOUNT:  [de-identified]   Patient's PCP: Paco Desouza MD  Admit Date: 2020   Discharge Date: 2020    Discharge Physician: Db Jacobs DO     The patient was seen and examined on day of discharge and this discharge summary is in conjunction with any daily progress note from day of discharge.     Active Discharge Diagnoses:     Primary Problem  Closed displaced fracture of left femoral neck [facility])      [facility] Problems    Diagnosis Date Noted    Anemia, normocytic normochromic [D64.9] 2020    Acute postoperative anemia due to expected blood loss [D62] 2020    Uncontrolled type 2 diabetes mellitus with hyperglycemia ([facility] [address] [E11.65]     Closed fracture of left hip (HCC) [S72.002A]     Moderate aortic stenosis [I35.0] 2020    Closed displaced fracture of left femoral neck ([facility] [address] [S72.002A] 2020    Diabetic neuropathy ([facility] [address] [E11.40] 2020    Essential hypertension [I10] 2020    Mixed hyperlipidemia [E78.2] 2020  Mild cognitive impairment [G31.84] 12/24/2020    Obsessive-compulsive disorder [F42.9] 12/24/2020         Hospital Course:     Brief History:  As documented in the medical record:  \"Irene High is a 81 y.o. Non-/non  female who presents with Leg Pain (fell this evening)   and is admitted to the hospital for the management of Closed displaced fracture of left femoral neck ([address]    Patient presented to the emergency department after a fall with a chief complaint of left hip and leg pain.  She states she was going down the steps when her shoe slipped and she fell. Chinyere Chou did not hit her head or lose consciousness.  The fall happened around 7 PM.  She denies any other injuries.  She has a history of hypertension, hyperlipidemia, OCD, diabetic neuropathy, mild cognitive impairment.  X-ray of the hip demonstrates a comminuted displaced intertrochanteric left femoral fracture.  Orthopedic surgery was consulted by the emergency department. Chinyere Chou is being admitted for further management of closed displaced fracture of the left femoral neck. \"      The patient was admitted     The patient underwent:  Procedure: CFN Left femur      Blood sugars were monitored and controlled  Her H&H or observed     The patient's condition was stabilized  Discharge planning initiated    Discharge plan:     Orthopedic evaluation and treatment, f/u Dr Cinda Cooks   Physical therapy / rehab  DVT prophylaxis: Eliquis  Pain management   Glycemic contol - monitor and control blood sugars  Blood Pressure - Monitor and control  Blood products prn - will check H&H  Anemia w/u on outpatient basis is suggested   Discharge planning  Med rec done  KODY done  Will discharge when arrangements complete and ok with other services. Follow-up with PCP in one week, Julee Wynne MD  Notify PCP of discharge   DCP 34 minutes plus    No discharge procedures on file.     Significant Diagnostic Studies:     Labs / Micro:  Labs:  Hematology:  Recent [employer] 12/28/20  0623 12/28/20  1107 12/29/20  0606   WBC  --   --  3.3*   RBC  --   --  2.51*   HGB 7.9* 8.5* 7.8*   HCT 24.2* 26.6* 24.0*   MCV  --   --  95.6   MCH  --   --  31.1   MCHC  --   --  32.5   RDW  --   --  15.0*   PLT  --   --  156   MPV  --   --  9.1     Chemistry:No results for input(s): NA, K, CL, CO2, GLUCOSE, BUN, CREATININE, MG, ANIONGAP, LABGLOM, GFRAA, CALCIUM, CAION, PHOS, PSA, PROBNP, TROPHS, CKTOTAL, CKMB, CKMBINDEX, MYOGLOBIN, DIGOXIN, LACTACIDWB in the last 72 hours. Recent Labs     12/29/20  0608 12/29/20  1152 12/29/20  1616 12/29/20  2020 12/30/20  0616 12/30/20  1124   POCGLU 166* 175* 158* 203* 148* 142*         Radiology:    Xr Pelvis (1-2 Views)    Result Date: 12/25/2020  EXAMINATION: 4 XRAY VIEWS OF THE LEFT FEMUR; ONE XRAY VIEW OF THE PELVIS 12/25/2020 3:34 pm COMPARISON: December 24, 2020 HISTORY: ORDERING SYSTEM PROVIDED HISTORY: post op TECHNOLOGIST PROVIDED HISTORY: post op Reason for Exam: post op Acuity: Unknown Type of Exam: Unknown FINDINGS: Postoperative left hip internal fixation. Intramedullary nail has been placed transfixing intertrochanteric fracture fragments which are in improved alignment. Mild left hip joint space narrowing. Degenerative changes within the lower lumbar spine. Mild right hip joint space narrowing. Expected postsurgical changes with soft tissue swelling and subcutaneous air. Advanced left knee degenerative changes. Postoperative left hip internal fixation. Xr Femur Left (min 2 Views)    Result Date: 12/25/2020  EXAMINATION: 4 XRAY VIEWS OF THE LEFT FEMUR; ONE XRAY VIEW OF THE PELVIS 12/25/2020 3:34 pm COMPARISON: December 24, 2020 HISTORY: ORDERING SYSTEM PROVIDED HISTORY: post op TECHNOLOGIST PROVIDED HISTORY: post op Reason for Exam: post op Acuity: Unknown Type of Exam: Unknown FINDINGS: Postoperative left hip internal fixation.   Intramedullary nail has been placed transfixing intertrochanteric fracture fragments which are in improved alignment. Mild left hip joint space narrowing. Degenerative changes within the lower lumbar spine. Mild right hip joint space narrowing. Expected postsurgical changes with soft tissue swelling and subcutaneous air. Advanced left knee degenerative changes. Postoperative left hip internal fixation. Xr Femur Left (min 2 Views)    Result Date: 12/24/2020  EXAMINATION: 2 XRAY VIEWS OF THE LEFT FEMUR 12/24/2020 9:40 pm COMPARISON: None. HISTORY: ORDERING SYSTEM PROVIDED HISTORY: pain TECHNOLOGIST PROVIDED HISTORY: pain Reason for Exam: Pain in left hip, fall today Acuity: Acute Type of Exam: Initial FINDINGS: Comminuted, displaced intertrochanteric left femoral fracture. Left hip alignment is maintained. Diffuse bone demineralization. There is advanced narrowing within the medial and lateral femorotibial joints. Comminuted, displaced intertrochanteric left femoral fracture. Fluoro For Surgical Procedures    Result Date: 12/25/2020  Radiology exam is complete. No Radiologist dictation. Please follow up with ordering provider.      Echocardiogram Limited 2d W Doppler W Color Adult    Result Date: 12/26/2020  [facility] Transthoracic Echocardiography Report (TTE)  Patient Name Carol Zaman Date of Study               12/26/2020   Date of      1935  Gender                      Female  Birth   Age          81 year(s)  Race                           Room Number  2024        Height:                     65 inch, 165.1 cm   Corporate ID [id number]    Weight:                     198 pounds, 89.8 kg  #   Patient [place] [de-identified]   BSA:          1.97 m^2      BMI:      32.95  #                                                              kg/m^2   MR #         [id number]     Sonographer                 [id number]   Accession #  [id number]  Interpreting Physician      [facility]   Fellow                   Referring Nurse                           Practitioner   Interpreting Referring Physician         Cortes Solis  Type of Study   TTE procedure:2D Echocardiogram, M-Mode, Doppler, Color Doppler. Procedure Date Date: 12/26/2020 Start: 10:53 AM Study Location: [address] [place] [address] Technical Quality: Adequate visualization Indications:Abnormal ECG. History / Tech. Comments: Procedure explained to patient. Patient Status: Inpatient Height: 65 inches Weight: 198 pounds BSA: 1.97 m^2 BMI: 32.95 kg/m^2 CONCLUSIONS Summary Mild left ventricular hypertrophy Global left ventricular systolic function is normal Estimated ejection fraction is 65 % . Aortic leaflet calcification with moderate stenosis. Peak instantaneous gradient 42 mmHg and mean gradient 25 mmHg. No aortic insufficiency. No pericardial effusion seen. Normal aortic root dimension. Signature ----------------------------------------------------------------------------  Electronically signed by Idalmis Petersen on 12/26/2020  11:16 AM ---------------------------------------------------------------------------- ----------------------------------------------------------------------------  Electronically signed by Elodia KennedyInterpreting physician) on  12/26/2020 04:49 PM ---------------------------------------------------------------------------- FINDINGS Left Atrium Left atrium is normal in size. Left Ventricle Mild left ventricular hypertrophy Global left ventricular systolic function is normal Estimated ejection fraction is 65 % . Right Atrium Right atrium is normal in size. Right Ventricle Normal right ventricular size and function. Mitral Valve Mitral annular calcification is seen. Trivial mitral regurgitation. Aortic Valve Aortic leaflet calcification with moderate stenosis. Peak instantaneous gradient 42 mmHg and mean gradient 25 mmHg. No aortic insufficiency. Tricuspid Valve Trivial tricuspid regurgitation. No pulmonary hypertension. Pulmonic Valve The pulmonic valve is normal in structure.  No pulmonic insufficiency. Pericardial Effusion No pericardial effusion seen. Miscellaneous Normal aortic root dimension. M-mode / 2D Measurements & Calculations:   LVIDd:4.22 cm(3.7 - 5.6 cm)       Diastolic LNVMXO:23.9 ml  UAGNB:8.79 cm(2.2 - 4.0 cm)       Systolic XYCSCC:55.5 ml  MFCQ:7.02 cm(0.6 - 1.1 cm)        Aortic Root:2.3 cm(2.0 - 3.7 cm)  LVPWd:1.02 cm(0.6 - 1.1 cm)       LA Dimension: 3 cm(1.9 - 4.0 cm)  Fractional Shortenin.44 %     LA volume/Index: 51.1 ml /26m^2  Calculated LVEF (%): 75.53 %      LVOT:2 cm   Mitral:                                 Aortic   Valve Area (P1/2-Time): 3.14 cm^2       Peak Velocity: 3.23 m/s  Peak E-Wave: 1.12 m/s                   Mean Velocity: 2.35 m/s  Peak A-Wave: 1.49 m/s                   Peak Gradient: 41.73 mmHg  E/A Ratio: 0.75                         Mean Gradient: 25 mmHg  Peak Gradient: 5.02 mmHg  Mean Gradient: 6 mmHg  Deceleration Time: 271 msec             Area (continuity): 1.35 cm^2  P1/2t: 70 msec                          AV VTI: 64.5 cm   Area (continuity): 2.21 cm^2  Mean Velocity: 1.13 m/s   Tricuspid:                              Pulmonic:   Estimated RVSP: 35 mmHg  Peak TR Velocity: 2.74 m/s  Peak TR Gradient: 30.0304 mmHg  Estimated RA Pressure: 5 mmHg                                          Estimated PASP: 35.03 mmHg  Septal Wall E' velocity:0.07 m/s    Xr Hip 2-3 Vw W Pelvis Left    Result Date: 2020  EXAMINATION: ONE XRAY VIEW OF THE PELVIS AND TWO XRAY VIEWS LEFT HIP 2020 9:40 pm COMPARISON: None. HISTORY: ORDERING SYSTEM PROVIDED HISTORY: pain after fall TECHNOLOGIST PROVIDED HISTORY: pain after fall Reason for Exam: Pain in left hip, fall today Acuity: Acute Type of Exam: Initial FINDINGS: Diffuse bone demineralization. Comminuted, displaced intertrochanteric left femoral fracture. Left hip alignment is maintained. Degenerative changes are noted in the lower lumbar spine and both hips.      Comminuted, displaced injection vial  · insulin lispro 100 UNIT/ML injection vial  · polyethylene glycol 17 g packet         Time Spent on discharge is  34 mins in patient examination, evaluation, counseling, medication reconciliation, discharge plan and follow up. Electronically signed by   Mignon Ortiz DO  12/30/2020  9:03 PM      Thank you Dr. Xuan Johnson MD for the opportunity to be involved in this patient's care.

## 2022-05-26 ENCOUNTER — HOSPITAL ENCOUNTER (EMERGENCY)
Age: 87
Discharge: ANOTHER HOSPITAL | End: 2022-05-26
Attending: EMERGENCY MEDICINE
Payer: COMMERCIAL

## 2022-05-26 ENCOUNTER — APPOINTMENT (OUTPATIENT)
Dept: GENERAL RADIOLOGY | Age: 87
End: 2022-05-26
Payer: COMMERCIAL

## 2022-05-26 VITALS
WEIGHT: 161 LBS | RESPIRATION RATE: 18 BRPM | BODY MASS INDEX: 29.63 KG/M2 | TEMPERATURE: 98.2 F | DIASTOLIC BLOOD PRESSURE: 50 MMHG | HEART RATE: 91 BPM | OXYGEN SATURATION: 97 % | SYSTOLIC BLOOD PRESSURE: 108 MMHG | HEIGHT: 62 IN

## 2022-05-26 DIAGNOSIS — M86.042 ACUTE HEMATOGENOUS OSTEOMYELITIS OF LEFT HAND (HCC): Primary | ICD-10-CM

## 2022-05-26 DIAGNOSIS — M65.9 FLEXOR TENOSYNOVITIS OF FINGER: ICD-10-CM

## 2022-05-26 LAB
ABSOLUTE EOS #: 0.03 K/UL (ref 0–0.44)
ABSOLUTE IMMATURE GRANULOCYTE: 0.02 K/UL (ref 0–0.3)
ABSOLUTE LYMPH #: 1.3 K/UL (ref 1.1–3.7)
ABSOLUTE MONO #: 0.48 K/UL (ref 0.1–1.2)
ANION GAP SERPL CALCULATED.3IONS-SCNC: 10 MMOL/L (ref 9–17)
BASOPHILS # BLD: 0 % (ref 0–2)
BASOPHILS ABSOLUTE: <0.03 K/UL (ref 0–0.2)
BUN BLDV-MCNC: 23 MG/DL (ref 8–23)
BUN/CREAT BLD: 23 (ref 9–20)
CALCIUM SERPL-MCNC: 9.2 MG/DL (ref 8.6–10.4)
CHLORIDE BLD-SCNC: 102 MMOL/L (ref 98–107)
CO2: 26 MMOL/L (ref 20–31)
CREAT SERPL-MCNC: 1.02 MG/DL (ref 0.5–0.9)
EOSINOPHILS RELATIVE PERCENT: 1 % (ref 1–4)
GFR AFRICAN AMERICAN: >60 ML/MIN
GFR NON-AFRICAN AMERICAN: 52 ML/MIN
GFR SERPL CREATININE-BSD FRML MDRD: ABNORMAL ML/MIN/{1.73_M2}
GLUCOSE BLD-MCNC: 123 MG/DL (ref 70–99)
HCT VFR BLD CALC: 30.1 % (ref 36.3–47.1)
HEMOGLOBIN: 9.6 G/DL (ref 11.9–15.1)
IMMATURE GRANULOCYTES: 0 %
LYMPHOCYTES # BLD: 28 % (ref 24–43)
MCH RBC QN AUTO: 32.1 PG (ref 25.2–33.5)
MCHC RBC AUTO-ENTMCNC: 31.9 G/DL (ref 28.4–34.8)
MCV RBC AUTO: 100.7 FL (ref 82.6–102.9)
MONOCYTES # BLD: 10 % (ref 3–12)
NRBC AUTOMATED: 0 PER 100 WBC
PDW BLD-RTO: 13.2 % (ref 11.8–14.4)
PLATELET # BLD: 195 K/UL (ref 138–453)
PMV BLD AUTO: 9.6 FL (ref 8.1–13.5)
POTASSIUM SERPL-SCNC: 4.5 MMOL/L (ref 3.7–5.3)
RBC # BLD: 2.99 M/UL (ref 3.95–5.11)
SEG NEUTROPHILS: 61 % (ref 36–65)
SEGMENTED NEUTROPHILS ABSOLUTE COUNT: 2.81 K/UL (ref 1.5–8.1)
SODIUM BLD-SCNC: 138 MMOL/L (ref 135–144)
WBC # BLD: 4.7 K/UL (ref 3.5–11.3)

## 2022-05-26 PROCEDURE — 96365 THER/PROPH/DIAG IV INF INIT: CPT

## 2022-05-26 PROCEDURE — 2580000003 HC RX 258: Performed by: PHYSICIAN ASSISTANT

## 2022-05-26 PROCEDURE — 73130 X-RAY EXAM OF HAND: CPT

## 2022-05-26 PROCEDURE — 87040 BLOOD CULTURE FOR BACTERIA: CPT

## 2022-05-26 PROCEDURE — 99285 EMERGENCY DEPT VISIT HI MDM: CPT

## 2022-05-26 PROCEDURE — 80048 BASIC METABOLIC PNL TOTAL CA: CPT

## 2022-05-26 PROCEDURE — 6360000002 HC RX W HCPCS: Performed by: PHYSICIAN ASSISTANT

## 2022-05-26 PROCEDURE — 85025 COMPLETE CBC W/AUTO DIFF WBC: CPT

## 2022-05-26 RX ORDER — M-VIT,TX,IRON,MINS/CALC/FOLIC 27MG-0.4MG
1 TABLET ORAL DAILY
COMMUNITY

## 2022-05-26 RX ADMIN — VANCOMYCIN HYDROCHLORIDE 1500 MG: 1 INJECTION, POWDER, LYOPHILIZED, FOR SOLUTION INTRAVENOUS at 12:40

## 2022-05-26 ASSESSMENT — PAIN DESCRIPTION - LOCATION: LOCATION: FINGER (COMMENT WHICH ONE)

## 2022-05-26 ASSESSMENT — PAIN - FUNCTIONAL ASSESSMENT: PAIN_FUNCTIONAL_ASSESSMENT: 0-10

## 2022-05-26 ASSESSMENT — ENCOUNTER SYMPTOMS
SHORTNESS OF BREATH: 0
COLOR CHANGE: 1
CHEST TIGHTNESS: 0

## 2022-05-26 ASSESSMENT — PAIN DESCRIPTION - ORIENTATION: ORIENTATION: LEFT

## 2022-05-26 ASSESSMENT — PAIN SCALES - GENERAL: PAINLEVEL_OUTOF10: 10

## 2022-05-26 NOTE — PROGRESS NOTES
Transitions of Care Pharmacy Service   Medication Review    The patient's list of current home medications has been reviewed. Source(s) of information: spoke to patient, daughter Merissa De La Rosa) and sure scripts     Based on information provided by the above source(s), I have updated the patient's home med list as described below. I changed or updated the following medications on the patient's home medication list:  Discontinued insulin lispro (HUMALOG) 100 UNIT/ML injection vial  apixaban (ELIQUIS) 2.5 MG TABS tablet     Added Multiple Vitamins-Minerals (THERAPEUTIC MULTIVITAMIN-MINERALS) tablet  Glucos-Chondroit-Hyaluron-MSM (GLUCOSAMINE CHONDROITIN JOINT PO)     Adjusted   None      Other Notes Patient stated that she was on apixaban (ELIQUIS) 2.5 MG TABS tablet for a short time after she had hip surgery. Patient stated that she was taking insulin lispro (HUMALOG) 100 UNIT/ML injection vial for a short term after surgery. Please feel free to call me with any questions about this encounter. Thank you. This note will be reviewed and co-signed by the Transitions of Care Pharmacist. The pharmacist will review inpatient orders and contact the physician about any discrepancies. Juan Mcdonald, pharmacy technician  Transitions of Care Pharmacy Service  Phone:  [phone]  Fax: [phone]      Electronically signed by Juan Mcdonald on 5/26/2022 at 5:01 PM       Prior to Admission medications    Medication Sig Start Date End Date Taking?  Authorizing Provider   Multiple Vitamins-Minerals (THERAPEUTIC MULTIVITAMIN-MINERALS) tablet Take 1 tablet by mouth daily       Glucos-Chondroit-Hyaluron-MSM (GLUCOSAMINE CHONDROITIN JOINT PO) Take 1 tablet by mouth daily       atorvastatin (LIPITOR) 20 MG tablet Take 20 mg by mouth daily       metFORMIN (GLUCOPHAGE) 500 MG tablet Take 500 mg by mouth 2 times daily (with meals)       lisinopril (PRINIVIL;ZESTRIL) 10 MG tablet Take 10 mg by mouth daily

## 2022-05-26 NOTE — ED PROVIDER NOTES
[address] [place] [address] ED  eMERGENCY dEPARTMENT eNCOUnter      Pt Name: Thee Rodriguez  MRN: [id number]  [place] 1935  Date of evaluation: 5/26/2022  Provider: Paty SUAREZ PA-C    CHIEF COMPLAINT       Chief Complaint   Patient presents with    Finger Pain     largely swollen index finger on left hand. laceration end of April - has taken atbx. finger was improving last week. HISTORY OF PRESENT ILLNESS  (Location/Symptom, Timing/Onset, Context/Setting, Quality, Duration, Modifying Factors, Severity.)   Thee Rodriguez is a 81 y.o. female who presents to the emergency department with complaint of redness and swelling to the left hand. Patient cut her left index finger in April and was treated with antibiotics which she completed. Daughter is at bedside and states that she saw the patient last week and her hand was not red or swollen. Patient denies any fevers, chills, nausea or vomiting. Nursing Notes were reviewed. ALLERGIES     Aleve [naproxen]    CURRENT MEDICATIONS       Previous Medications    APIXABAN (ELIQUIS) 2.5 MG TABS TABLET    Take 1 tablet by mouth 2 times daily    ATORVASTATIN (LIPITOR) 20 MG TABLET    Take 20 mg by mouth daily    INSULIN LISPRO (HUMALOG) 100 UNIT/ML INJECTION VIAL    Inject 0-3 Units into the skin nightly    INSULIN LISPRO (HUMALOG) 100 UNIT/ML INJECTION VIAL    Inject 0-6 Units into the skin 3 times daily (with meals)    LISINOPRIL (PRINIVIL;ZESTRIL) 10 MG TABLET    Take 10 mg by mouth daily    METFORMIN (GLUCOPHAGE) 500 MG TABLET    Take 500 mg by mouth 2 times daily (with meals)       PAST MEDICAL HISTORY         Diagnosis Date    Diabetes mellitus ([facility] [address]     Hyperlipidemia     Hypertension        SURGICAL HISTORY           Procedure Laterality Date    APPENDECTOMY      CHOLECYSTECTOMY      HIP FRACTURE SURGERY Left 12/25/2020    HIP OPEN REDUCTION INTERNAL FIXATION performed by Cy Agrawal MD at [place] [address]     History reviewed.  No pertinent family history. No family status information on file. SOCIAL HISTORY      reports that she has never smoked. She has never used smokeless tobacco. She reports that she does not drink alcohol and does not use drugs. REVIEW OF SYSTEMS    (2-9 systems for level 4, 10 or more for level 5)     Review of Systems   Constitutional: Negative for chills, fatigue and fever. Respiratory: Negative for chest tightness and shortness of breath. Cardiovascular: Negative for chest pain and palpitations. Musculoskeletal: Positive for arthralgias and joint swelling. Skin: Positive for color change. Except as noted above the remainder of the review of systems was reviewed and negative. PHYSICAL EXAM    (up to 7 for level 4, 8 or more for level 5)     ED Triage Vitals [05/26/22 1144]   BP Temp Temp Source Heart Rate Resp SpO2 Height Weight   123/72 98.2 °F (36.8 °C) Oral 90 18 95 % 5' 2\" (1.575 m) 161 lb (73 kg)       Physical Exam  Vitals and nursing note reviewed. Constitutional:       General: She is not in acute distress. Appearance: Normal appearance. Cardiovascular:      Rate and Rhythm: Normal rate and regular rhythm. Pulses: Normal pulses. Heart sounds: Normal heart sounds. Pulmonary:      Effort: Pulmonary effort is normal.      Breath sounds: Normal breath sounds. Musculoskeletal:      Right hand: Swelling and tenderness present. Decreased range of motion. Skin:            Comments: Significant swelling and erythema to the left hand on the dorsum and palmar surface. There is some proximal streaking. Neurological:      General: No focal deficit present. Mental Status: She is alert and oriented to person, place, and time. Psychiatric:         Mood and Affect: Mood normal.         Behavior: Behavior normal.         Thought Content:  Thought content normal.         Judgment: Judgment normal.           DIAGNOSTIC RESULTS     EKG: All EKG's are interpreted by mL/min    GFR African American >60 >60 mL/min    GFR Comment             All other labs were within normal range or not returned as of this dictation. EMERGENCY DEPARTMENT COURSE and DIFFERENTIAL DIAGNOSIS/MDM:   Vitals:    Vitals:    05/26/22 1144   BP: 123/72   Pulse: 90   Resp: 18   Temp: 98.2 °F (36.8 °C)   TempSrc: Oral   SpO2: 95%   Weight: 161 lb (73 kg)   Height: 5' 2\" (1.575 m)           Medical Decision Making patient is an 71-year-old female with osteomyelitis of the left hand. Blood cultures have been obtained and vancomycin started. Patient will be admitted to the hospital for further evaluation and treatment    CONSULTS:  IP CONSULT TO INTERNAL MEDICINE    PROCEDURES:  None    FINAL IMPRESSION      1. Acute hematogenous osteomyelitis of left hand (HCC)          DISPOSITION/PLAN   DISPOSITION        PATIENT REFERRED TO:   No follow-up provider specified.     DISCHARGE MEDICATIONS:     New Prescriptions    No medications on file         (Please note that portions of this note were completed with a voice recognition program.  Efforts were made to edit the dictations but occasionally words are mis-transcribed.)    Rosa SUAREZ PA-C  Attending Emergency Physician         Mathew Mckeon PA-C  05/26/22 3971

## 2022-05-26 NOTE — ED PROVIDER NOTES
[facility] ED  EMERGENCY DEPARTMENT ENCOUNTER   ATTENDING ATTESTATION     Pt Name: Bandar Sandoval  MRN: [id number]  [place] 1935  Date of evaluation: 5/26/22       Bandar Sandoval is a 81 y.o. female who presents with Finger Pain (largely swollen index finger on left hand. laceration end of April - has taken atbx. finger was improving last week. )      MDM:     56-year-old female presents with complaints of finger pain, patient's left hand has significant swelling to her pointer finger, concerning for flexor tenosynovitis, x-ray shows osteomyelitis, she was given IV antibiotics. We discussed with orthopedic surgery at [facility] and [facility], they recommended transfer. Patient requested transfer to [facility] we spoke with plastic surgery and internal medicine. Vitals:   Vitals:    05/26/22 1144   BP: 123/72   Pulse: 90   Resp: 18   Temp: 98.2 °F (36.8 °C)   TempSrc: Oral   SpO2: 95%   Weight: 161 lb (73 kg)   Height: 5' 2\" (1.575 m)         This visit was performed by both a physician and an APC. I personally evaluated and examined the patient.  I performed all aspects of the MDM as documented     Lakhwinder Herrera MD  Attending Emergency  Physician                  Julio Cesar Ballard MD  05/26/22 6629

## 2022-05-26 NOTE — ED NOTES
Writer called report to Sade Doe RN at [facility]. Updated on patient status and transportation ETA.         Nubia Edouard RN  05/26/22 1914

## 2022-05-26 NOTE — ED NOTES
ED to inpatient nurses report     Chief Complaint   Patient presents with    Finger Pain     largely swollen index finger on left hand. laceration end of April - has taken atbx. finger was improving last week.        Present to ED from home  LOC: alert and orientated to name, place, date  Vital signs   Vitals:    05/26/22 1144   BP: 123/72   Pulse: 90   Resp: 18   Temp: 98.2 °F (36.8 °C)   TempSrc: Oral   SpO2: 95%   Weight: 161 lb (73 kg)   Height: 5' 2\" (1.575 m)      Oxygen Baseline RA    Current needs required none   LDAs:   Peripheral IV 05/26/22 Right Antecubital (Active)   Site Assessment Clean, dry & intact 05/26/22 1223   Line Status Blood return noted 05/26/22 1223     Mobility: Independent  Pending ED orders:   Present condition: stable   Code Status: Full  Consults: IP CONSULT TO INTERNAL MEDICINE  []  Hospitalist  Completed  [] yes [] no Who:   []  Medicine  Completed  [] yes [] No Who:   []  Cardiology  Completed  [] yes [] No Who:   []  GI   Completed  [] yes [] No Who:   []  Neurology  Completed  [] yes [] No Who:   []  Nephrology Completed  [] yes [] No Who:    []  Vascular  Completed  [] yes [] No Who:   []  Ortho  Completed  [] yes [] No Who:     []  Surgery  Completed  [] yes [] No Who:    []  Urology  Completed  [] yes [] No Who:    []  CT Surgery Completed  [] yes [] No Who:   []  Podiatry  Completed  [] yes [] No Who:    []  Other    Completed  [] yes [] No Who:  Interventions: Administered Vanco   Important Events:         Electronically signed by Nicol Avilez RN on 5/26/2022 at 2:07 PM       Nicol Avilez RN  05/26/22 5005

## 2022-05-31 LAB
CULTURE: NORMAL
CULTURE: NORMAL
Lab: NORMAL
Lab: NORMAL
SPECIMEN DESCRIPTION: NORMAL
SPECIMEN DESCRIPTION: NORMAL

## 2022-05-31 NOTE — ED PROVIDER NOTES
eMERGENCY dEPARTMENT eNCOUnter   Independent Attestation     Pt Name: Chepe Kyle  MRN: [id number]  [place] 1935  Date of evaluation: 5/31/22     Chepe Kyle is a 81 y.o. female with CC: Finger Pain (largely swollen index finger on left hand. laceration end of April - has taken atbx. finger was improving last week. )      This visit was performed by both a physician and an APC. I performed all aspects of the MDM as documented. Based on the medical record the care appears appropriate. I was personally available for consultation in the Emergency Department.     The care is provided during an unprecedented national emergency due to the novel coronavirus, Anika Medina MD  Attending Emergency Physician                    Teja Weber MD  05/31/22 [id number]

## 2022-07-08 ENCOUNTER — HOSPITAL ENCOUNTER (OUTPATIENT)
Age: 87
Setting detail: SPECIMEN
Discharge: HOME | End: 2022-07-08
Payer: COMMERCIAL

## 2022-07-08 LAB
ANION GAP SERPL CALCULATED.3IONS-SCNC: 9 MMOL/L (ref 9–17)
BUN BLDV-MCNC: 19 MG/DL (ref 8–23)
BUN/CREAT BLD: 22 (ref 9–20)
CALCIUM SERPL-MCNC: 8.7 MG/DL (ref 8.6–10.4)
CHLORIDE BLD-SCNC: 105 MMOL/L (ref 98–107)
CHOLESTEROL/HDL RATIO: 2.6
CHOLESTEROL: 151 MG/DL
CO2: 28 MMOL/L (ref 20–31)
CREAT SERPL-MCNC: 0.85 MG/DL (ref 0.5–0.9)
ESTIMATED AVERAGE GLUCOSE: 108 MG/DL
GFR AFRICAN AMERICAN: >60 ML/MIN
GFR NON-AFRICAN AMERICAN: >60 ML/MIN
GFR SERPL CREATININE-BSD FRML MDRD: ABNORMAL ML/MIN/{1.73_M2}
GLUCOSE BLD-MCNC: 72 MG/DL (ref 70–99)
HBA1C MFR BLD: 5.4 % (ref 4–6)
HCT VFR BLD CALC: 27.4 % (ref 36.3–47.1)
HDLC SERPL-MCNC: 58 MG/DL
HEMOGLOBIN: 8.4 G/DL (ref 11.9–15.1)
LDL CHOLESTEROL: 81 MG/DL (ref 0–130)
MCH RBC QN AUTO: 31.6 PG (ref 25.2–33.5)
MCHC RBC AUTO-ENTMCNC: 30.7 G/DL (ref 28.4–34.8)
MCV RBC AUTO: 103 FL (ref 82.6–102.9)
NRBC AUTOMATED: 0 PER 100 WBC
PDW BLD-RTO: 14.6 % (ref 11.8–14.4)
PLATELET # BLD: 213 K/UL (ref 138–453)
PMV BLD AUTO: 9.5 FL (ref 8.1–13.5)
POTASSIUM SERPL-SCNC: 4.2 MMOL/L (ref 3.7–5.3)
RBC # BLD: 2.66 M/UL (ref 3.95–5.11)
SODIUM BLD-SCNC: 142 MMOL/L (ref 135–144)
TRIGL SERPL-MCNC: 58 MG/DL
WBC # BLD: 3.4 K/UL (ref 3.5–11.3)

## 2022-07-08 PROCEDURE — 80048 BASIC METABOLIC PNL TOTAL CA: CPT

## 2022-07-08 PROCEDURE — P9603 ONE-WAY ALLOW PRORATED MILES: HCPCS

## 2022-07-08 PROCEDURE — 36415 COLL VENOUS BLD VENIPUNCTURE: CPT

## 2022-07-08 PROCEDURE — 83036 HEMOGLOBIN GLYCOSYLATED A1C: CPT

## 2022-07-08 PROCEDURE — 85027 COMPLETE CBC AUTOMATED: CPT

## 2022-07-08 PROCEDURE — 80061 LIPID PANEL: CPT

## 2022-07-13 ENCOUNTER — HOSPITAL ENCOUNTER (OUTPATIENT)
Age: 87
Setting detail: SPECIMEN
Discharge: HOME | End: 2022-07-13
Payer: COMMERCIAL

## 2022-07-13 LAB
ANION GAP SERPL CALCULATED.3IONS-SCNC: 9 MMOL/L (ref 9–17)
BUN BLDV-MCNC: 15 MG/DL (ref 8–23)
BUN/CREAT BLD: 17 (ref 9–20)
CALCIUM SERPL-MCNC: 9.1 MG/DL (ref 8.6–10.4)
CHLORIDE BLD-SCNC: 106 MMOL/L (ref 98–107)
CHOLESTEROL/HDL RATIO: 2.3
CHOLESTEROL: 142 MG/DL
CO2: 27 MMOL/L (ref 20–31)
CREAT SERPL-MCNC: 0.87 MG/DL (ref 0.5–0.9)
ESTIMATED AVERAGE GLUCOSE: 97 MG/DL
GFR AFRICAN AMERICAN: >60 ML/MIN
GFR NON-AFRICAN AMERICAN: >60 ML/MIN
GFR SERPL CREATININE-BSD FRML MDRD: ABNORMAL ML/MIN/{1.73_M2}
GLUCOSE BLD-MCNC: 106 MG/DL (ref 70–99)
HBA1C MFR BLD: 5 % (ref 4–6)
HCT VFR BLD CALC: 28.7 % (ref 36.3–47.1)
HDLC SERPL-MCNC: 63 MG/DL
HEMOGLOBIN: 8.8 G/DL (ref 11.9–15.1)
LDL CHOLESTEROL: 64 MG/DL (ref 0–130)
MCH RBC QN AUTO: 31.1 PG (ref 25.2–33.5)
MCHC RBC AUTO-ENTMCNC: 30.7 G/DL (ref 28.4–34.8)
MCV RBC AUTO: 101.4 FL (ref 82.6–102.9)
NRBC AUTOMATED: 0 PER 100 WBC
PDW BLD-RTO: 14.6 % (ref 11.8–14.4)
PLATELET # BLD: 233 K/UL (ref 138–453)
PMV BLD AUTO: 9.2 FL (ref 8.1–13.5)
POTASSIUM SERPL-SCNC: 4.4 MMOL/L (ref 3.7–5.3)
RBC # BLD: 2.83 M/UL (ref 3.95–5.11)
SODIUM BLD-SCNC: 142 MMOL/L (ref 135–144)
TRIGL SERPL-MCNC: 76 MG/DL
WBC # BLD: 3.5 K/UL (ref 3.5–11.3)

## 2022-07-13 PROCEDURE — 83036 HEMOGLOBIN GLYCOSYLATED A1C: CPT

## 2022-07-13 PROCEDURE — 85027 COMPLETE CBC AUTOMATED: CPT

## 2022-07-13 PROCEDURE — 36415 COLL VENOUS BLD VENIPUNCTURE: CPT

## 2022-07-13 PROCEDURE — 80048 BASIC METABOLIC PNL TOTAL CA: CPT

## 2022-07-13 PROCEDURE — 80061 LIPID PANEL: CPT

## 2022-07-13 PROCEDURE — P9603 ONE-WAY ALLOW PRORATED MILES: HCPCS

## 2022-07-28 ENCOUNTER — HOSPITAL ENCOUNTER (OUTPATIENT)
Age: 87
Setting detail: SPECIMEN
Discharge: HOME | End: 2022-07-28
Payer: COMMERCIAL

## 2022-07-28 LAB
HCT VFR BLD CALC: 28.8 % (ref 36.3–47.1)
HEMOGLOBIN: 9.3 G/DL (ref 11.9–15.1)
MCH RBC QN AUTO: 31.7 PG (ref 25.2–33.5)
MCHC RBC AUTO-ENTMCNC: 32.3 G/DL (ref 28.4–34.8)
MCV RBC AUTO: 98.3 FL (ref 82.6–102.9)
NRBC AUTOMATED: 0 PER 100 WBC
PDW BLD-RTO: 14.2 % (ref 11.8–14.4)
PLATELET # BLD: 205 K/UL (ref 138–453)
PMV BLD AUTO: 9.1 FL (ref 8.1–13.5)
RBC # BLD: 2.93 M/UL (ref 3.95–5.11)
WBC # BLD: 3.3 K/UL (ref 3.5–11.3)

## 2022-07-28 PROCEDURE — 85027 COMPLETE CBC AUTOMATED: CPT

## 2022-07-28 PROCEDURE — P9603 ONE-WAY ALLOW PRORATED MILES: HCPCS

## 2022-07-28 PROCEDURE — 36415 COLL VENOUS BLD VENIPUNCTURE: CPT

## 2023-06-20 ENCOUNTER — HOSPITAL ENCOUNTER (OUTPATIENT)
Age: 88
Setting detail: SPECIMEN
Discharge: HOME | End: 2023-06-20

## 2023-06-20 PROCEDURE — 81001 URINALYSIS AUTO W/SCOPE: CPT

## 2023-06-20 PROCEDURE — 87086 URINE CULTURE/COLONY COUNT: CPT

## 2023-06-21 LAB
BILIRUB UR QL STRIP: NEGATIVE
CASTS #/AREA URNS LPF: NORMAL /LPF (ref 0–8)
CLARITY UR: CLEAR
COLOR UR: YELLOW
EPI CELLS #/AREA URNS HPF: NORMAL /HPF (ref 0–5)
GLUCOSE UR STRIP-MCNC: NEGATIVE MG/DL
HGB UR QL STRIP.AUTO: NEGATIVE
KETONES UR STRIP-MCNC: NEGATIVE MG/DL
LEUKOCYTE ESTERASE UR QL STRIP: NEGATIVE
NITRITE UR QL STRIP: POSITIVE
PH UR STRIP: 5.5 [PH] (ref 5–8)
PROT UR STRIP-MCNC: NEGATIVE MG/DL
RBC #/AREA URNS HPF: NORMAL /HPF (ref 0–4)
SP GR UR STRIP: 1.01 (ref 1–1.03)
UROBILINOGEN UR STRIP-ACNC: NORMAL
WBC #/AREA URNS HPF: NORMAL /HPF (ref 0–5)

## 2023-06-22 LAB
MICROORGANISM SPEC CULT: NORMAL
SERVICE CMNT-IMP: NORMAL
SPECIMEN DESCRIPTION: NORMAL

## 2023-06-26 ENCOUNTER — HOSPITAL ENCOUNTER (OUTPATIENT)
Age: 88
Setting detail: SPECIMEN
Discharge: HOME | End: 2023-06-26
Payer: COMMERCIAL

## 2023-06-26 LAB
ANION GAP SERPL CALCULATED.3IONS-SCNC: 8 MMOL/L (ref 9–17)
BUN SERPL-MCNC: 18 MG/DL (ref 8–23)
BUN/CREAT SERPL: 19 (ref 9–20)
CALCIUM SERPL-MCNC: 9.1 MG/DL (ref 8.6–10.4)
CHLORIDE SERPL-SCNC: 106 MMOL/L (ref 98–107)
CHOLEST SERPL-MCNC: 152 MG/DL
CHOLESTEROL/HDL RATIO: 2
CO2 SERPL-SCNC: 27 MMOL/L (ref 20–31)
CREAT SERPL-MCNC: 0.97 MG/DL (ref 0.5–0.9)
ERYTHROCYTE [DISTWIDTH] IN BLOOD BY AUTOMATED COUNT: 13 % (ref 11.8–14.4)
EST. AVERAGE GLUCOSE BLD GHB EST-MCNC: 114 MG/DL
GFR SERPL CREATININE-BSD FRML MDRD: 57 ML/MIN/1.73M2
GLUCOSE SERPL-MCNC: 114 MG/DL (ref 70–99)
HBA1C MFR BLD: 5.6 % (ref 4–6)
HCT VFR BLD AUTO: 29.1 % (ref 36.3–47.1)
HDLC SERPL-MCNC: 75 MG/DL
HGB BLD-MCNC: 9.5 G/DL (ref 11.9–15.1)
LDLC SERPL CALC-MCNC: 66 MG/DL (ref 0–130)
MCH RBC QN AUTO: 33.2 PG (ref 25.2–33.5)
MCHC RBC AUTO-ENTMCNC: 32.6 G/DL (ref 28.4–34.8)
MCV RBC AUTO: 101.7 FL (ref 82.6–102.9)
NRBC BLD-RTO: 0 PER 100 WBC
PLATELET # BLD AUTO: 180 K/UL (ref 138–453)
PMV BLD AUTO: 9.2 FL (ref 8.1–13.5)
POTASSIUM SERPL-SCNC: 4.7 MMOL/L (ref 3.7–5.3)
RBC # BLD AUTO: 2.86 M/UL (ref 3.95–5.11)
SODIUM SERPL-SCNC: 141 MMOL/L (ref 135–144)
TRIGL SERPL-MCNC: 54 MG/DL
WBC OTHER # BLD: 3.6 K/UL (ref 3.5–11.3)

## 2023-06-26 PROCEDURE — 36415 COLL VENOUS BLD VENIPUNCTURE: CPT

## 2023-06-26 PROCEDURE — P9603 ONE-WAY ALLOW PRORATED MILES: HCPCS

## 2023-06-26 PROCEDURE — 83036 HEMOGLOBIN GLYCOSYLATED A1C: CPT

## 2023-06-26 PROCEDURE — 80061 LIPID PANEL: CPT

## 2023-06-26 PROCEDURE — 80048 BASIC METABOLIC PNL TOTAL CA: CPT

## 2023-06-26 PROCEDURE — 85027 COMPLETE CBC AUTOMATED: CPT

## 2023-07-07 ENCOUNTER — HOSPITAL ENCOUNTER (OUTPATIENT)
Age: 88
Setting detail: SPECIMEN
Discharge: HOME | End: 2023-07-07

## 2023-07-07 LAB
ANION GAP SERPL CALCULATED.3IONS-SCNC: 8 MMOL/L (ref 9–17)
BUN SERPL-MCNC: 16 MG/DL (ref 8–23)
BUN/CREAT SERPL: 16 (ref 9–20)
CALCIUM SERPL-MCNC: 9 MG/DL (ref 8.6–10.4)
CHLORIDE SERPL-SCNC: 102 MMOL/L (ref 98–107)
CO2 SERPL-SCNC: 27 MMOL/L (ref 20–31)
CREAT SERPL-MCNC: 1.01 MG/DL (ref 0.5–0.9)
ERYTHROCYTE [DISTWIDTH] IN BLOOD BY AUTOMATED COUNT: 12.7 % (ref 11.8–14.4)
GFR SERPL CREATININE-BSD FRML MDRD: 54 ML/MIN/1.73M2
GLUCOSE SERPL-MCNC: 157 MG/DL (ref 70–99)
HCT VFR BLD AUTO: 30.7 % (ref 36.3–47.1)
HGB BLD-MCNC: 10 G/DL (ref 11.9–15.1)
MCH RBC QN AUTO: 33.7 PG (ref 25.2–33.5)
MCHC RBC AUTO-ENTMCNC: 32.6 G/DL (ref 28.4–34.8)
MCV RBC AUTO: 103.4 FL (ref 82.6–102.9)
NRBC BLD-RTO: 0 PER 100 WBC
PLATELET # BLD AUTO: 191 K/UL (ref 138–453)
PMV BLD AUTO: 8.9 FL (ref 8.1–13.5)
POTASSIUM SERPL-SCNC: 4.9 MMOL/L (ref 3.7–5.3)
RBC # BLD AUTO: 2.97 M/UL (ref 3.95–5.11)
SODIUM SERPL-SCNC: 137 MMOL/L (ref 135–144)
WBC OTHER # BLD: 3.9 K/UL (ref 3.5–11.3)

## 2023-07-07 PROCEDURE — 36415 COLL VENOUS BLD VENIPUNCTURE: CPT

## 2023-07-07 PROCEDURE — P9603 ONE-WAY ALLOW PRORATED MILES: HCPCS

## 2023-07-07 PROCEDURE — 85027 COMPLETE CBC AUTOMATED: CPT

## 2023-07-07 PROCEDURE — 80048 BASIC METABOLIC PNL TOTAL CA: CPT

## 2025-03-05 ENCOUNTER — HOSPITAL ENCOUNTER (OUTPATIENT)
Age: 89
Setting detail: SPECIMEN
Discharge: HOME | End: 2025-03-05

## 2025-03-05 LAB
ANION GAP SERPL CALCULATED.3IONS-SCNC: 13 MMOL/L (ref 9–16)
BUN SERPL-MCNC: 20 MG/DL (ref 8–23)
CALCIUM SERPL-MCNC: 9.6 MG/DL (ref 8.8–10.2)
CHLORIDE SERPL-SCNC: 108 MMOL/L (ref 98–107)
CHOLEST SERPL-MCNC: 161 MG/DL (ref 0–199)
CHOLESTEROL/HDL RATIO: 2.2
CO2 SERPL-SCNC: 24 MMOL/L (ref 20–31)
CREAT SERPL-MCNC: 1 MG/DL (ref 0.5–0.9)
ERYTHROCYTE [DISTWIDTH] IN BLOOD BY AUTOMATED COUNT: 13.8 % (ref 11.8–14.4)
EST. AVERAGE GLUCOSE BLD GHB EST-MCNC: 114 MG/DL
GFR, ESTIMATED: 57 ML/MIN/1.73M2
GLUCOSE SERPL-MCNC: 148 MG/DL (ref 82–115)
HBA1C MFR BLD: 5.6 % (ref 4–6)
HCT VFR BLD AUTO: 33.5 % (ref 36.3–47.1)
HDLC SERPL-MCNC: 74 MG/DL
HGB BLD-MCNC: 11.3 G/DL (ref 11.9–15.1)
LDLC SERPL CALC-MCNC: 61 MG/DL (ref 0–100)
MAGNESIUM SERPL-MCNC: 2 MG/DL (ref 1.6–2.4)
MCH RBC QN AUTO: 33.2 PG (ref 25.2–33.5)
MCHC RBC AUTO-ENTMCNC: 33.7 G/DL (ref 28.4–34.8)
MCV RBC AUTO: 98.5 FL (ref 82.6–102.9)
NRBC BLD-RTO: 0 PER 100 WBC
PLATELET # BLD AUTO: 220 K/UL (ref 138–453)
PMV BLD AUTO: 9.2 FL (ref 8.1–13.5)
POTASSIUM SERPL-SCNC: 4.4 MMOL/L (ref 3.7–5.3)
RBC # BLD AUTO: 3.4 M/UL (ref 3.95–5.11)
SODIUM SERPL-SCNC: 145 MMOL/L (ref 136–145)
TRIGL SERPL-MCNC: 131 MG/DL
VLDLC SERPL CALC-MCNC: 26 MG/DL (ref 1–30)
WBC OTHER # BLD: 4.1 K/UL (ref 3.5–11.3)

## 2025-03-05 PROCEDURE — 83735 ASSAY OF MAGNESIUM: CPT

## 2025-03-05 PROCEDURE — 36415 COLL VENOUS BLD VENIPUNCTURE: CPT

## 2025-03-05 PROCEDURE — 80048 BASIC METABOLIC PNL TOTAL CA: CPT

## 2025-03-05 PROCEDURE — 85027 COMPLETE CBC AUTOMATED: CPT

## 2025-03-05 PROCEDURE — 80061 LIPID PANEL: CPT

## 2025-03-05 PROCEDURE — 83036 HEMOGLOBIN GLYCOSYLATED A1C: CPT

## (undated) DEVICE — ROD RMR L950MM DIA2.5MM W/ EXTN BALL TIP

## (undated) DEVICE — GLOVE SURG SZ 8 L12IN THK91MIL BRN LTX FREE POLYCHLOROPRENE

## (undated) DEVICE — SUTURE VCRL SZ 2-0 L27IN ABSRB UD L36MM CP-1 1/2 CIR REV J266H

## (undated) DEVICE — BANDAGE COBAN 4 IN COMPR W4INXL5YD FOAM COHESIVE QUIK STK SELF ADH SFT

## (undated) DEVICE — GOWN,AURORA,NONRNF,XL,30/CS: Brand: MEDLINE

## (undated) DEVICE — BLANKET WRM W29.9XL79.1IN UP BODY FORC AIR MISTRAL-AIR

## (undated) DEVICE — DRAPE C ARM UNIV W41XL74IN CLR PLAS XR VELC CLSR POLY STRP

## (undated) DEVICE — GLOVE SURG SZ 75 CRM LTX FREE POLYISOPRENE POLYMER BEAD ANTI

## (undated) DEVICE — 6619 2 PTNT ISO SYS INCISE AREA&LT;(&GT;&&LT;)&GT;P: Brand: STERI-DRAPE™ IOBAN™ 2

## (undated) DEVICE — PADDING CAST W6INXL4YD POLY POR SPUN DACRON SYN VERSATILE

## (undated) DEVICE — SURGICAL PROCEDURE KIT BASIN MAJ SET UP

## (undated) DEVICE — Device

## (undated) DEVICE — Z DISCONTINUED USE 2744636  DRESSING AQUACEL 14 IN ALG W3.5XL14IN POLYUR FLM CVR W/ HYDRCOLL

## (undated) DEVICE — IMPLANTABLE DEVICE
Type: IMPLANTABLE DEVICE | Site: HIP | Status: NON-FUNCTIONAL
Removed: 2020-12-25

## (undated) DEVICE — SUTURE VCRL SZ 0 L36IN ABSRB UD L36MM CT-1 1/2 CIR J946H

## (undated) DEVICE — BANDAGE COMPR W6INXL5YD WHT BGE POLY COT M E WRP WV HK AND

## (undated) DEVICE — 3M™ STERI-STRIP™ COMPOUND BENZOIN TINCTURE 40 BAGS/CARTON 4 CARTONS/CASE C1544: Brand: 3M™ STERI-STRIP™

## (undated) DEVICE — STRIP,CLOSURE,WOUND,MEDI-STRIP,1/2X4: Brand: MEDLINE

## (undated) DEVICE — APPLICATOR MEDICATED 26 CC SOLUTION HI LT ORNG CHLORAPREP

## (undated) DEVICE — BANDAGE COMPR W4INXL5YD WHT BGE POLY COT M E WRP WV HK AND

## (undated) DEVICE — ADHESIVE SKIN CLSR 0.7ML TOP DERMBND ADV

## (undated) DEVICE — SUTURE MCRYL SZ 3-0 L27IN ABSRB UD PS-2 3/8 CIR REV CUT NDL MCP427H

## (undated) DEVICE — GUIDEWIRE ORTH L400MM DIA3.2MM FOR TFN